# Patient Record
Sex: FEMALE | Race: WHITE | Employment: FULL TIME | URBAN - METROPOLITAN AREA
[De-identification: names, ages, dates, MRNs, and addresses within clinical notes are randomized per-mention and may not be internally consistent; named-entity substitution may affect disease eponyms.]

---

## 2017-07-21 ENCOUNTER — OFFICE VISIT (OUTPATIENT)
Dept: FAMILY MEDICINE CLINIC | Age: 47
End: 2017-07-21

## 2017-07-21 VITALS
SYSTOLIC BLOOD PRESSURE: 118 MMHG | HEART RATE: 86 BPM | BODY MASS INDEX: 22.66 KG/M2 | WEIGHT: 132 LBS | OXYGEN SATURATION: 97 % | DIASTOLIC BLOOD PRESSURE: 70 MMHG

## 2017-07-21 DIAGNOSIS — Z00.00 WELL ADULT EXAM: Primary | ICD-10-CM

## 2017-07-21 DIAGNOSIS — L21.9 SEBORRHEIC DERMATITIS OF SCALP: ICD-10-CM

## 2017-07-21 DIAGNOSIS — F51.01 PRIMARY INSOMNIA: ICD-10-CM

## 2017-07-21 PROCEDURE — 99396 PREV VISIT EST AGE 40-64: CPT | Performed by: FAMILY MEDICINE

## 2017-07-21 RX ORDER — CLOBETASOL PROPIONATE 0.5 MG/G
AEROSOL, FOAM TOPICAL
Qty: 1 CAN | Refills: 12 | Status: SHIPPED | OUTPATIENT
Start: 2017-07-21 | End: 2019-01-07 | Stop reason: CLARIF

## 2017-07-21 RX ORDER — AMITRIPTYLINE HYDROCHLORIDE 10 MG/1
TABLET, FILM COATED ORAL
Qty: 30 TABLET | Refills: 12 | Status: SHIPPED | OUTPATIENT
Start: 2017-07-21 | End: 2018-08-03 | Stop reason: SDUPTHER

## 2017-07-21 RX ORDER — M-VIT,TX,IRON,MINS/CALC/FOLIC 27MG-0.4MG
1 TABLET ORAL DAILY
COMMUNITY
End: 2019-09-12 | Stop reason: CLARIF

## 2017-08-02 LAB
HPV COMMENT: NORMAL
HPV TYPE 16: NOT DETECTED
HPV TYPE 18: NOT DETECTED
HPVOH (OTHER TYPES): NOT DETECTED

## 2017-09-22 ENCOUNTER — TELEPHONE (OUTPATIENT)
Dept: FAMILY MEDICINE CLINIC | Age: 47
End: 2017-09-22

## 2017-09-25 ENCOUNTER — OFFICE VISIT (OUTPATIENT)
Dept: FAMILY MEDICINE CLINIC | Age: 47
End: 2017-09-25

## 2017-09-25 VITALS
OXYGEN SATURATION: 99 % | DIASTOLIC BLOOD PRESSURE: 70 MMHG | HEART RATE: 82 BPM | BODY MASS INDEX: 23.21 KG/M2 | WEIGHT: 131 LBS | SYSTOLIC BLOOD PRESSURE: 110 MMHG

## 2017-09-25 DIAGNOSIS — F41.9 ANXIETY: Primary | ICD-10-CM

## 2017-09-25 PROCEDURE — 99213 OFFICE O/P EST LOW 20 MIN: CPT | Performed by: FAMILY MEDICINE

## 2017-12-27 ENCOUNTER — HOSPITAL ENCOUNTER (OUTPATIENT)
Dept: MAMMOGRAPHY | Age: 47
Discharge: HOME | End: 2017-12-27
Attending: OBSTETRICS & GYNECOLOGY | Admitting: OBSTETRICS & GYNECOLOGY

## 2017-12-27 DIAGNOSIS — Z12.39 BREAST CANCER SCREENING: ICD-10-CM

## 2018-01-08 ENCOUNTER — HOSPITAL ENCOUNTER (OUTPATIENT)
Dept: OTHER | Age: 48
Discharge: HOME | End: 2018-01-08
Attending: FAMILY MEDICINE | Admitting: FAMILY MEDICINE

## 2018-01-08 DIAGNOSIS — Z12.39 BREAST CANCER SCREENING: ICD-10-CM

## 2018-03-28 ENCOUNTER — TELEPHONE (OUTPATIENT)
Dept: FAMILY MEDICINE CLINIC | Age: 48
End: 2018-03-28

## 2018-03-28 RX ORDER — FLUCONAZOLE 150 MG/1
150 TABLET ORAL ONCE
Qty: 1 TABLET | Refills: 0 | Status: SHIPPED | OUTPATIENT
Start: 2018-03-28 | End: 2018-03-28

## 2018-08-03 RX ORDER — AMITRIPTYLINE HYDROCHLORIDE 10 MG/1
TABLET, FILM COATED ORAL
Qty: 30 TABLET | Refills: 0 | Status: SHIPPED | OUTPATIENT
Start: 2018-08-03 | End: 2018-08-15 | Stop reason: SDUPTHER

## 2018-08-15 ENCOUNTER — OFFICE VISIT (OUTPATIENT)
Dept: FAMILY MEDICINE CLINIC | Age: 48
End: 2018-08-15

## 2018-08-15 VITALS
WEIGHT: 127 LBS | BODY MASS INDEX: 22.5 KG/M2 | HEART RATE: 79 BPM | OXYGEN SATURATION: 98 % | SYSTOLIC BLOOD PRESSURE: 100 MMHG | DIASTOLIC BLOOD PRESSURE: 66 MMHG

## 2018-08-15 DIAGNOSIS — F51.01 PRIMARY INSOMNIA: ICD-10-CM

## 2018-08-15 DIAGNOSIS — K63.5 POLYP OF COLON, UNSPECIFIED PART OF COLON, UNSPECIFIED TYPE: ICD-10-CM

## 2018-08-15 DIAGNOSIS — Z00.00 WELL ADULT EXAM: Primary | ICD-10-CM

## 2018-08-15 PROCEDURE — 99396 PREV VISIT EST AGE 40-64: CPT | Performed by: FAMILY MEDICINE

## 2018-08-15 RX ORDER — AMITRIPTYLINE HYDROCHLORIDE 10 MG/1
TABLET, FILM COATED ORAL
Qty: 90 TABLET | Refills: 3 | Status: SHIPPED | OUTPATIENT
Start: 2018-08-15 | End: 2019-08-23 | Stop reason: SDUPTHER

## 2018-08-15 RX ORDER — ASCORBIC ACID 1000 MG
TABLET ORAL DAILY
COMMUNITY
End: 2019-09-12 | Stop reason: CLARIF

## 2018-08-15 ASSESSMENT — PATIENT HEALTH QUESTIONNAIRE - PHQ9
1. LITTLE INTEREST OR PLEASURE IN DOING THINGS: 0
SUM OF ALL RESPONSES TO PHQ QUESTIONS 1-9: 0
SUM OF ALL RESPONSES TO PHQ9 QUESTIONS 1 & 2: 0
SUM OF ALL RESPONSES TO PHQ QUESTIONS 1-9: 0
2. FEELING DOWN, DEPRESSED OR HOPELESS: 0

## 2018-08-15 NOTE — PROGRESS NOTES
Subjective:      Patient ID: Will Beltre is a 50 y.o. female. HPI patient presents today for her annual physical.    Here for annual physical.    Dental up-to-date  Eye up-to-date  Pap NA    Exercise: 3-4 times a week, mix of biking, bar, walking, yoga  Diet: best it's been in a long time    HM reviewed with pt    Patient's medications, allergies, past medical, surgical, social and family histories were reviewed and updated in the EHR as appropriate. Uses amtriptyline for sleep, works well, no SE. Review of Systems    Objective:   Physical Exam    Body mass index is 22.5 kg/m². Vitals:    08/15/18 1613   BP: 100/66   Site: Left Arm   Position: Sitting   Cuff Size: Medium Adult   Pulse: 79   SpO2: 98%   Weight: 127 lb (57.6 kg)     Wt Readings from Last 3 Encounters:   08/15/18 127 lb (57.6 kg)   09/25/17 131 lb (59.4 kg)   09/22/17 130 lb (59 kg)     PHQ score: PHQ-9 Total Score: 0 (8/15/2018  4:14 PM)    GENERAL:Alert and oriented x 4 NAD, normal appearing weight, well hydrated, well developed. NECK:supple and non tender without mass, no thyromegaly or thyroid nodules, no cervical lymphadenopathy, no bruits  LUNG:clear to auscultation bilaterally with normal respiratory effort  CV: Normal heart sounds, regular rate and rhythm without murmurs  EXTREMETY: no loss of hair, no edema, normal pedal pulses bilaterally            Assessment:     Britni Franco was seen today for annual exam.    Diagnoses and all orders for this visit:    Well adult exam  Continue healthy diet and exercise  HM reveiwed with pt    Primary insomnia  -Stable, continue current medications.     Polyp of colon, unspecified part of colon, unspecified type  UTD with colonoscopy    Other orders  -     amitriptyline (ELAVIL) 10 MG tablet; TAKE ONE TABLET BY MOUTH ONCE NIGHTLY

## 2018-12-15 ENCOUNTER — TELEPHONE (OUTPATIENT)
Dept: FAMILY MEDICINE CLINIC | Age: 48
End: 2018-12-15

## 2019-01-07 ENCOUNTER — TELEPHONE (OUTPATIENT)
Dept: FAMILY MEDICINE CLINIC | Age: 49
End: 2019-01-07

## 2019-01-07 ENCOUNTER — OFFICE VISIT (OUTPATIENT)
Dept: FAMILY MEDICINE CLINIC | Age: 49
End: 2019-01-07
Payer: COMMERCIAL

## 2019-01-07 VITALS
WEIGHT: 130 LBS | OXYGEN SATURATION: 98 % | BODY MASS INDEX: 23.03 KG/M2 | SYSTOLIC BLOOD PRESSURE: 120 MMHG | HEART RATE: 102 BPM | DIASTOLIC BLOOD PRESSURE: 76 MMHG

## 2019-01-07 DIAGNOSIS — F41.9 ANXIETY: Primary | ICD-10-CM

## 2019-01-07 PROCEDURE — 99213 OFFICE O/P EST LOW 20 MIN: CPT | Performed by: FAMILY MEDICINE

## 2019-01-10 ENCOUNTER — HOSPITAL ENCOUNTER (OUTPATIENT)
Dept: WOMENS IMAGING | Age: 49
Discharge: HOME | End: 2019-01-10
Payer: COMMERCIAL

## 2019-01-10 DIAGNOSIS — Z12.31 ENCOUNTER FOR SCREENING MAMMOGRAM FOR BREAST CANCER: ICD-10-CM

## 2019-01-10 PROCEDURE — 77063 BREAST TOMOSYNTHESIS BI: CPT

## 2019-08-23 RX ORDER — AMITRIPTYLINE HYDROCHLORIDE 10 MG/1
TABLET, FILM COATED ORAL
Qty: 30 TABLET | Refills: 0 | Status: SHIPPED | OUTPATIENT
Start: 2019-08-23 | End: 2019-09-12 | Stop reason: SDUPTHER

## 2019-08-23 NOTE — TELEPHONE ENCOUNTER
Pt requesting medication refill on   amitriptyline (ELAVIL) 10 MG tablet     Pharmacy:  [facility] [address], [address] [phone] - F [phone]   Please contact pt when filled    LOV:1/7/19  FOV:9/12/19

## 2019-08-23 NOTE — TELEPHONE ENCOUNTER
Pt requesting medication refill on   amitriptyline (ELAVIL) 10 MG tablet      Pharmacy:  [facility] [address], [address] [phone] - F [phone]   Please contact pt when filled     LOV:1/7/19  FOV:9/12/19

## 2019-08-23 NOTE — TELEPHONE ENCOUNTER
Medication:   Requested Prescriptions     Pending Prescriptions Disp Refills    amitriptyline (ELAVIL) 10 MG tablet 90 tablet 3     Sig: TAKE ONE TABLET BY MOUTH ONCE NIGHTLY      Last Filled:  8/15/2018    Patient Phone Number: [phone] (home) [phone] (work)    Last appt: 1/7/2019   Next appt: 9/12/2019    Last OARRS: No flowsheet data found.     Preferred Pharmacy:   [facility] [address]  [place] [address]  Phone: [phone] Fax: [phone]

## 2019-09-12 ENCOUNTER — OFFICE VISIT (OUTPATIENT)
Dept: FAMILY MEDICINE CLINIC | Age: 49
End: 2019-09-12
Payer: COMMERCIAL

## 2019-09-12 VITALS
HEIGHT: 64 IN | HEART RATE: 93 BPM | SYSTOLIC BLOOD PRESSURE: 124 MMHG | OXYGEN SATURATION: 95 % | WEIGHT: 134 LBS | DIASTOLIC BLOOD PRESSURE: 80 MMHG | BODY MASS INDEX: 22.88 KG/M2

## 2019-09-12 DIAGNOSIS — Z00.00 WELL ADULT EXAM: Primary | ICD-10-CM

## 2019-09-12 DIAGNOSIS — Z23 NEED FOR INFLUENZA VACCINATION: ICD-10-CM

## 2019-09-12 DIAGNOSIS — F51.01 PRIMARY INSOMNIA: ICD-10-CM

## 2019-09-12 DIAGNOSIS — F41.9 ANXIETY: ICD-10-CM

## 2019-09-12 PROCEDURE — 90686 IIV4 VACC NO PRSV 0.5 ML IM: CPT | Performed by: FAMILY MEDICINE

## 2019-09-12 PROCEDURE — 99396 PREV VISIT EST AGE 40-64: CPT | Performed by: FAMILY MEDICINE

## 2019-09-12 PROCEDURE — 90471 IMMUNIZATION ADMIN: CPT | Performed by: FAMILY MEDICINE

## 2019-09-12 RX ORDER — AMITRIPTYLINE HYDROCHLORIDE 10 MG/1
TABLET, FILM COATED ORAL
Qty: 30 TABLET | Refills: 12 | Status: SHIPPED | OUTPATIENT
Start: 2019-09-12 | End: 2020-09-14

## 2019-09-12 SDOH — HEALTH STABILITY: MENTAL HEALTH: HOW OFTEN DO YOU HAVE A DRINK CONTAINING ALCOHOL?: 2-4 TIMES A MONTH

## 2019-09-12 SDOH — HEALTH STABILITY: MENTAL HEALTH: HOW MANY STANDARD DRINKS CONTAINING ALCOHOL DO YOU HAVE ON A TYPICAL DAY?: 3 OR 4

## 2019-09-12 ASSESSMENT — PATIENT HEALTH QUESTIONNAIRE - PHQ9
2. FEELING DOWN, DEPRESSED OR HOPELESS: 0
1. LITTLE INTEREST OR PLEASURE IN DOING THINGS: 0
SUM OF ALL RESPONSES TO PHQ9 QUESTIONS 1 & 2: 0
SUM OF ALL RESPONSES TO PHQ QUESTIONS 1-9: 0
SUM OF ALL RESPONSES TO PHQ QUESTIONS 1-9: 0

## 2019-09-12 NOTE — PATIENT INSTRUCTIONS
season. But even when the vaccine doesn't exactly match these viruses, it may still provide some protection. Flu vaccine cannot prevent:  · Flu that is caused by a virus not covered by the vaccine. · Illnesses that look like flu but are not. Some people should not get this vaccine  Tell the person who is giving you the vaccine:  · If you have any severe (life-threatening) allergies. If you ever had a life-threatening allergic reaction after a dose of flu vaccine, or have a severe allergy to any part of this vaccine, you may be advised not to get vaccinated. Most, but not all, types of flu vaccine contain a small amount of egg protein. · If you ever had Guillain-Barré syndrome (also called GBS) Some people with a history of GBS should not get this vaccine. This should be discussed with your doctor. · If you are not feeling well. It is usually okay to get flu vaccine when you have a mild illness, but you might be asked to come back when you feel better. Risks of a vaccine reaction  With any medicine, including vaccines, there is a chance of reactions. These are usually mild and go away on their own, but serious reactions are also possible. Most people who get a flu shot do not have any problems with it. Minor problems following a flu shot include:  · Soreness, redness, or swelling where the shot was given  · Hoarseness  · Sore, red or itchy eyes  · Cough  · Fever  · Aches  · Headache  · Itching  · Fatigue  If these problems occur, they usually begin soon after the shot and last 1 or 2 days. More serious problems following a flu shot can include the following:  · There may be a small increased risk of Guillain-Barré Syndrome (GBS) after inactivated flu vaccine. This risk has been estimated at 1 or 2 additional cases per million people vaccinated. This is much lower than the risk of severe complications from flu, which can be prevented by flu vaccine.   · Artemio York children who get the flu shot along with pneumococcal vaccine (PCV13) and/or DTaP vaccine at the same time might be slightly more likely to have a seizure caused by fever. Ask your doctor for more information. Tell your doctor if a child who is getting flu vaccine has ever had a seizure  Problems that could happen after any injected vaccine:  · People sometimes faint after a medical procedure, including vaccination. Sitting or lying down for about 15 minutes can help prevent fainting, and injuries caused by a fall. Tell your doctor if you feel dizzy, or have vision changes or ringing in the ears. · Some people get severe pain in the shoulder and have difficulty moving the arm where a shot was given. This happens very rarely. · Any medication can cause a severe allergic reaction. Such reactions from a vaccine are very rare, estimated at about 1 in a million doses, and would happen within a few minutes to a few hours after the vaccination. As with any medicine, there is a very remote chance of a vaccine causing a serious injury or death. The safety of vaccines is always being monitored. For more information, visit: www.cdc.gov/vaccinesafety/. What if there is a serious reaction? What should I look for? · Look for anything that concerns you, such as signs of a severe allergic reaction, very high fever, or unusual behavior. Signs of a severe allergic reaction can include hives, swelling of the face and throat, difficulty breathing, a fast heartbeat, dizziness, and weakness - usually within a few minutes to a few hours after the vaccination. What should I do? · If you think it is a severe allergic reaction or other emergency that can't wait, call 9-1-1 and get the person to the nearest hospital. Otherwise, call your doctor. · Reactions should be reported to the \"Vaccine Adverse Event Reporting System\" (VAERS). Your doctor should file this report, or you can do it yourself through the VAERS website at www.vaers. [facility].gov, or by calling

## 2020-01-14 ENCOUNTER — HOSPITAL ENCOUNTER (OUTPATIENT)
Dept: WOMENS IMAGING | Age: 50
Discharge: HOME | End: 2020-01-14
Payer: COMMERCIAL

## 2020-01-14 PROCEDURE — 77063 BREAST TOMOSYNTHESIS BI: CPT

## 2020-07-14 ENCOUNTER — OFFICE VISIT (OUTPATIENT)
Dept: PRIMARY CARE CLINIC | Age: 50
End: 2020-07-14
Payer: COMMERCIAL

## 2020-07-14 PROCEDURE — 99211 OFF/OP EST MAY X REQ PHY/QHP: CPT | Performed by: NURSE PRACTITIONER

## 2020-07-19 LAB
SARS-COV-2: NOT DETECTED
SOURCE: NORMAL

## 2020-07-21 ENCOUNTER — OFFICE VISIT (OUTPATIENT)
Dept: FAMILY MEDICINE CLINIC | Age: 50
End: 2020-07-21
Payer: COMMERCIAL

## 2020-07-21 VITALS
OXYGEN SATURATION: 99 % | BODY MASS INDEX: 23.23 KG/M2 | WEIGHT: 136.4 LBS | SYSTOLIC BLOOD PRESSURE: 108 MMHG | TEMPERATURE: 98.4 F | HEART RATE: 91 BPM | DIASTOLIC BLOOD PRESSURE: 68 MMHG

## 2020-07-21 PROCEDURE — 99213 OFFICE O/P EST LOW 20 MIN: CPT | Performed by: PHYSICIAN ASSISTANT

## 2020-07-21 RX ORDER — LORAZEPAM 0.5 MG/1
0.5 TABLET ORAL NIGHTLY PRN
Qty: 30 TABLET | Refills: 0 | Status: SHIPPED | OUTPATIENT
Start: 2020-07-21 | End: 2020-09-14

## 2020-07-21 RX ORDER — AMITRIPTYLINE HYDROCHLORIDE 25 MG/1
25 TABLET, FILM COATED ORAL NIGHTLY
Qty: 30 TABLET | Refills: 5 | Status: SHIPPED | OUTPATIENT
Start: 2020-07-21 | End: 2020-09-14 | Stop reason: SDUPTHER

## 2020-07-21 RX ORDER — ZOSTER VACCINE RECOMBINANT, ADJUVANTED 50 MCG/0.5
0.5 KIT INTRAMUSCULAR SEE ADMIN INSTRUCTIONS
Qty: 0.5 ML | Refills: 0 | Status: SHIPPED | OUTPATIENT
Start: 2020-07-21 | End: 2020-09-14

## 2020-07-21 RX ORDER — M-VIT,TX,IRON,MINS/CALC/FOLIC 27MG-0.4MG
1 TABLET ORAL DAILY
COMMUNITY
End: 2021-09-24

## 2020-07-21 ASSESSMENT — PATIENT HEALTH QUESTIONNAIRE - PHQ9
SUM OF ALL RESPONSES TO PHQ9 QUESTIONS 1 & 2: 0
SUM OF ALL RESPONSES TO PHQ QUESTIONS 1-9: 0
SUM OF ALL RESPONSES TO PHQ QUESTIONS 1-9: 0
1. LITTLE INTEREST OR PLEASURE IN DOING THINGS: 0
2. FEELING DOWN, DEPRESSED OR HOPELESS: 0

## 2020-07-21 ASSESSMENT — ENCOUNTER SYMPTOMS
COUGH: 0
BACK PAIN: 0
SHORTNESS OF BREATH: 0
ABDOMINAL PAIN: 0

## 2020-07-21 NOTE — PATIENT INSTRUCTIONS
Psychiatry    [facility] Consultation and Crisis Team (Suicide)  [phone]    [facility] Team (Suicide)  [phone]    Psychology Today  Resource to find providers  Www. psychologytoday. Clara Radames [facility]  [phone]    [address] [facility] [address]   [facility], [address]   Phone: [phone]    Fax: [phone] Psychological and Counseling Services  [address] [place] [address]   [phone]    MD Dr. Julio Kaur Dr., MD Shirlean Meals, MD  [address],   [address]   [phone]    Ramon Pollard, PhD  Shonna Montero, PhD  Yashira Traylor, PhD  [facility]  [phone]    [address] [address] [address]  [phone]    Janese Angelucci, [address] [place] [address]. [facility], [address] [facility]  [phone]    Dr. Nabil Haskins  [phone] (outpatient)      Dr. Shira Arias  [facility] [place]  [phone]    Dr. Marlen Talavera MD  [facility].  [facility], . [address]  [phone]    Dr. Robin Marino MD  [facility].  [facility], . [address]    Patrick Olivia MD   [phone]    [facility]  [phone]    [facility]  Dr. Ralph Cardoza  [phone] [address] [facility] [address], [address] [facility]  [facility]. [address]  Phone [phone], Fax [phone]    [address]  [phone]    Substance Abuse    [facility]  [address] [place] [address].   [address]  Phone  [phone]  Walk-in treatment assessments Monday- Friday 8AM-2PM    Mental Health and Substance Abuse  [address] Gonsalo Bland Anandaeloisa. [facility], [address] [place] [address]  Phone [phone]    [address]  [address]    Linn Renee L[facility]  [phone]    [facility]  [phone]    [facility]  [address] [place] [address] Way  [phone]              Joie Ramos was seen today for anxiety. Diagnoses and all orders for this visit:    Anxiety  -     amitriptyline (ELAVIL) 25 MG tablet; Take 1 tablet by mouth nightly  -     LORazepam (ATIVAN) 0.5 MG tablet; Take 1 tablet by mouth nightly as needed for Anxiety for up to 30 days. Need for shingles vaccine  -     zoster recombinant adjuvanted vaccine [facility]) 50 MCG/0.5ML SUSR injection;  Inject 0.5 mLs into the muscle See Admin Instructions 1 dose now and repeat in 2-6 months

## 2020-07-21 NOTE — PROGRESS NOTES
tablet; Take 1 tablet by mouth nightly  -     LORazepam (ATIVAN) 0.5 MG tablet; Take 1 tablet by mouth nightly as needed for Anxiety for up to 30 days. Need for shingles vaccine  -     zoster recombinant adjuvanted vaccine [facility]) 50 MCG/0.5ML SUSR injection; Inject 0.5 mLs into the muscle See Admin Instructions 1 dose now and repeat in 2-6 months             Plan:      Controlled substances monitoring: possible medication side effects, risk of tolerance and/or dependence, and alternative treatments discussed and no signs of potential drug abuse or diversion identified. Return in 2 months for physical, call before that with any concerns.    Sarah Gross

## 2020-09-02 ENCOUNTER — TELEPHONE (OUTPATIENT)
Dept: FAMILY MEDICINE CLINIC | Age: 50
End: 2020-09-02

## 2020-09-02 NOTE — TELEPHONE ENCOUNTER
Pt reports having right elbow pain for 1 month. Cannot recall any injury to right elbow. C/o pain in elbow when gripping, pulling or pushing. Tried aleve, advil and ibuprofen without relief. She states appt on 9/14 and wanting to know if she could have a stronger anti inflammatory she could try prior to her appointment and then discuss if it is helping or not and next steps at her next appointment.

## 2020-09-02 NOTE — TELEPHONE ENCOUNTER
Diclofenac 50mg BID with food x 7 days then prn pain #30 1 RF    See if wants to be seen sooner for her elbow

## 2020-09-02 NOTE — TELEPHONE ENCOUNTER
Patient requesting blood work orders for her upcoming appt on 9/14/2020. Patient will be going to [facility] for her blood work.

## 2020-09-02 NOTE — TELEPHONE ENCOUNTER
----- Message from Derian Khloes sent at 9/2/2020  8:43 AM EDT -----  Subject: Message to Provider    QUESTIONS  Information for Provider? Patient is having pain in right elbow. They did   not have any known injury to their elbow. They want to know if they can be   prescribed Meloxicam 15mg because regular pain medication and ice isn't   helping  ---------------------------------------------------------------------------  --------------  CALL BACK INFO  What is the best way for the office to contact you? OK to leave message on   voicemail  Preferred Call Back Phone Number? [phone]  ---------------------------------------------------------------------------  --------------  SCRIPT ANSWERS  Relationship to Patient?  Self

## 2020-09-09 ENCOUNTER — HOSPITAL ENCOUNTER (OUTPATIENT)
Age: 50
Discharge: HOME | End: 2020-09-09
Payer: COMMERCIAL

## 2020-09-09 LAB
CHOLESTEROL, TOTAL: 228 MG/DL (ref 0–199)
GLUCOSE BLD-MCNC: 87 MG/DL (ref 70–99)
HDLC SERPL-MCNC: 53 MG/DL (ref 40–60)
LDL CHOLESTEROL CALCULATED: 156 MG/DL
TRIGL SERPL-MCNC: 97 MG/DL (ref 0–150)
VLDLC SERPL CALC-MCNC: 19 MG/DL

## 2020-09-09 PROCEDURE — 36415 COLL VENOUS BLD VENIPUNCTURE: CPT

## 2020-09-09 PROCEDURE — 82947 ASSAY GLUCOSE BLOOD QUANT: CPT

## 2020-09-09 PROCEDURE — 80061 LIPID PANEL: CPT

## 2020-09-14 ENCOUNTER — OFFICE VISIT (OUTPATIENT)
Dept: FAMILY MEDICINE CLINIC | Age: 50
End: 2020-09-14
Payer: COMMERCIAL

## 2020-09-14 VITALS
DIASTOLIC BLOOD PRESSURE: 68 MMHG | OXYGEN SATURATION: 99 % | HEIGHT: 65 IN | WEIGHT: 141 LBS | HEART RATE: 74 BPM | TEMPERATURE: 97.3 F | SYSTOLIC BLOOD PRESSURE: 114 MMHG | BODY MASS INDEX: 23.49 KG/M2

## 2020-09-14 PROBLEM — E78.00 PURE HYPERCHOLESTEROLEMIA: Status: ACTIVE | Noted: 2020-09-14

## 2020-09-14 PROCEDURE — 99396 PREV VISIT EST AGE 40-64: CPT | Performed by: FAMILY MEDICINE

## 2020-09-14 RX ORDER — AMITRIPTYLINE HYDROCHLORIDE 10 MG/1
TABLET, FILM COATED ORAL
Qty: 60 TABLET | Refills: 5 | Status: SHIPPED | OUTPATIENT
Start: 2020-09-14 | End: 2021-05-10

## 2020-09-14 NOTE — PATIENT INSTRUCTIONS
Patient Education     Orthopedists:    [facility]  Dr. Marshal Mayer  [phone]    Ayaka Mcintosh and MD Eva Beck MD Gerrianne Ares, MD Earlis Quick, MD  [phone]    Kathyleen Stallion Dr. Gerhardt Camera Dr. Tonya Brunt Dr. Lannette Brown Dr. Jeannie Morrison Dr. Ronnie Comas Dr. Johnney Clara Dr. Emory Singer Dr. Marce Para Dr. Irl Forehand Dr. Bronwyn Armor Dr. Jeane Buckler Dr. [employer] Dr. Rosslyn Jaksch Dr. Eilleen Browns  [phone] [address]  Dr. Lorie Niño   [phone]         Well Visit, Women 48 to 72: Care Instructions  Your Care Instructions     Physical exams can help you stay healthy. Your doctor has checked your overall health and may have suggested ways to take good care of yourself. He or she also may have recommended tests. At home, you can help prevent illness with healthy eating, regular exercise, and other steps. Follow-up care is a key part of your treatment and safety. Be sure to make and go to all appointments, and call your doctor if you are having problems. It's also a good idea to know your test results and keep a list of the medicines you take. How can you care for yourself at home? · Reach and stay at a healthy weight. This will lower your risk for many problems, such as obesity, diabetes, heart disease, and high blood pressure. · Get at least 30 minutes of exercise on most days of the week. Walking is a good choice. You also may want to do other activities, such as running, swimming, cycling, or playing tennis or team sports. · Do not smoke. Smoking can make health problems worse. If you need help quitting, talk to your doctor about stop-smoking programs and medicines. These can increase your chances of quitting for good. · Protect your skin from too much sun. When you're outdoors from 10 a.m. to 4 p.m., stay in the shade or cover up with clothing and a hat with a wide brim. Wear sunglasses that block UV rays. Even when it's cloudy, put broad-spectrum sunscreen (SPF 30 or higher) on any exposed skin. · See a dentist one or two times a year for checkups and to have your teeth cleaned. · Wear a seat belt in the car. Follow your doctor's advice about when to have certain tests. These tests can spot problems early. · Cholesterol. Your doctor will tell you how often to have this done based on your age, family history, or other things that can increase your risk for heart attack and stroke. · Blood pressure. Have your blood pressure checked during a routine doctor visit. Your doctor will tell you how often to check your blood pressure based on your age, your blood pressure results, and other factors. · Mammogram. Ask your doctor how often you should have a mammogram, which is an X-ray of your breasts. A mammogram can spot breast cancer before it can be felt and when it is easiest to treat. · Pap test and pelvic exam. Ask your doctor how often you should have a Pap test. You may not need to have a Pap test as often as you used to. · Vision. Have your eyes checked every year or two or as often as your doctor suggests. Some experts recommend that you have yearly exams for glaucoma and other age-related eye problems starting at age 48. · Hearing. Tell your doctor if you notice any change in your hearing. You can have tests to find out how well you hear. · Diabetes. Ask your doctor whether you should have tests for diabetes. · Colorectal cancer. Your risk for colorectal cancer gets higher as you get older. Some experts say that adults should start regular screening at age 48 and stop at age 76. Others say to start before age 48 or continue after age 76. Talk with your doctor about your risk and when to start and stop screening. · Thyroid disease.  Talk to your doctor about whether to have your thyroid checked as part of a regular physical exam. Women have an increased chance of a thyroid problem. · Osteoporosis. You should begin tests for bone density at age 72. If you are younger than 72, ask your doctor whether you have factors that may increase your risk for this disease. You may want to have this test before age 72. · Heart attack and stroke risk. At least every 4 to 6 years, you should have your risk for heart attack and stroke assessed. Your doctor uses factors such as your age, blood pressure, cholesterol, and whether you smoke or have diabetes to show what your risk for a heart attack or stroke is over the next 10 years. When should you call for help? Watch closely for changes in your health, and be sure to contact your doctor if you have any problems or symptoms that concern you. Where can you learn more? Go to https://[employer]peiaineb.[employer]. org and sign in to your [employer] account. Enter P258 in the [employer] box to learn more about \"Well Visit, Women 50 to 72: Care Instructions. \"     If you do not have an account, please click on the \"Sign Up Now\" link. Current as of: August 22, 2019               Content Version: 12.5  © 4336-3152 Healthwise, Incorporated. Care instructions adapted under license by [facility] ([facility]). If you have questions about a medical condition or this instruction, always ask your healthcare professional. [place] [address] any warranty or liability for your use of this information. Patient Education        Tennis Elbow: Exercises  Introduction  Here are some examples of exercises for you to try. The exercises may be suggested for a condition or for rehabilitation. Start each exercise slowly. Ease off the exercises if you start to have pain. You will be told when to start these exercises and which ones will work best for you. How to do the exercises  Wrist flexor stretch   1.  Extend your arm in front

## 2020-09-14 NOTE — PROGRESS NOTES
Here for annual physical.    Dental: up-to-date  Eye: up-to-date    Colonoscopy: up-to-date    Pap: up-to-date, see gynecologist in 2 weeks, had hysterectomy   Mammo: up-to-date    Exercise: walking 3 1/2 miles 3-5 times a week  Diet: not very good, could be be better    Saw AJ in July for anxiety. Got lorazepam and increased amitriptyline for sleep. Only took twice. Increasing amitriptyline helped with sleep and feeling better and handling things better. Having right elbow pain. Has been taking diclofenac. Helping but not getting better. Doing more typing than normal working from home. Notes with  even with coffee cup or bending will get pain in elbow. Feels swollen but doesn't see the swelling. Yesterday had to lift chair hurt. Pt has been icing after activity and helps.   + right handed. HM reviewed with pt    Patient's medications, allergies, past medical, surgical, social and family histories were reviewed and updated in the EHR as appropriate. Body mass index is 23.83 kg/m². Vitals:    09/14/20 0750   BP: 114/68   Site: Left Upper Arm   Position: Sitting   Cuff Size: Medium Adult   Pulse: 74   Temp: 97.3 °F (36.3 °C)   TempSrc: Temporal   SpO2: 99%   Weight: 141 lb (64 kg)   Height: 5' 4.5\" (1.638 m)     Wt Readings from Last 3 Encounters:   09/14/20 141 lb (64 kg)   07/21/20 136 lb 6.4 oz (61.9 kg)   09/12/19 134 lb (60.8 kg)     PHQ score: No data recorded    GENERAL:Alert and oriented x 4 NAD, affect appropriate and normal appearing weight, well hydrated, well developed.   NECK:supple and non tender without mass, no thyromegaly or thyroid nodules, no cervical lymphadenopathy  LUNG:clear to auscultation bilaterally with normal respiratory effort  CV: Normal heart sounds, regular rate and rhythm without murmurs  EXTREMETY: no loss of hair, no edema, normal pedal pulses bilaterally  Right elbow Pain over lateral epicondyle with flexion of wrist and , tender palpation over lateral epicondyle      ASSESSMENT AND PLAN:       El oV was seen today for annual exam.    Diagnoses and all orders for this visit:    Well adult exam  Recommended screenings discussed and ordered if patient agreed  Recommended vaccinations discussed and ordered if patient agreed- going to get flu at pharmacy  Encouraged healthy diet   Encouraged regular exercise and maintaining a healthy weight    Anxiety    -Stable, continue current medications. Controlled Substances Monitoring:   OARRS report reviewed  Periodic Controlled Substance Monitoring: No signs of potential drug abuse or diversion identified. Ava Pierce MD)        Primary insomnia  -Stable, continue current medications. -     amitriptyline (ELAVIL) 10 MG tablet; 1-2 at night as needed for sleep    Lateral epicondylitis of right elbow  See ortho    Pure hypercholesterolemia  -Encourage low carb diet, watching calories, regular exercise and weight loss  Recheck yearly  Reviewed recent labs with patient. Return in about 1 year (around 9/14/2021).            Note per JAKY Kolb and Scribe with corrections and edits per Ava Pierce MD.  I agree with entirety of note and was present and performed history and physical.  I also confirm that the note above accurately reflects all work, treatment, procedures, and medical decision making performed by me, Ava Pierce MD

## 2020-09-18 ENCOUNTER — OFFICE VISIT (OUTPATIENT)
Dept: ORTHOPEDIC SURGERY | Age: 50
End: 2020-09-18
Payer: COMMERCIAL

## 2020-09-18 VITALS — WEIGHT: 140 LBS | TEMPERATURE: 97.3 F | HEIGHT: 64 IN | BODY MASS INDEX: 23.9 KG/M2

## 2020-09-18 PROCEDURE — 20551 NJX 1 TENDON ORIGIN/INSJ: CPT | Performed by: ORTHOPAEDIC SURGERY

## 2020-09-18 PROCEDURE — 99203 OFFICE O/P NEW LOW 30 MIN: CPT | Performed by: ORTHOPAEDIC SURGERY

## 2020-09-18 RX ORDER — METHYLPREDNISOLONE ACETATE 40 MG/ML
40 INJECTION, SUSPENSION INTRA-ARTICULAR; INTRALESIONAL; INTRAMUSCULAR; SOFT TISSUE ONCE
Status: COMPLETED | OUTPATIENT
Start: 2020-09-18 | End: 2020-09-18

## 2020-09-18 RX ADMIN — METHYLPREDNISOLONE ACETATE 40 MG: 40 INJECTION, SUSPENSION INTRA-ARTICULAR; INTRALESIONAL; INTRAMUSCULAR; SOFT TISSUE at 09:16

## 2020-09-18 NOTE — PROGRESS NOTES
Chief Complaint    Elbow Injury (right elbow )      History of Present Illness:  Marilu Roldan is a 48 y.o. female. She is here today for evaluation of her right elbow. She has had lateral sided right elbow pain that is been going on since the end of July of this year. She does not remember one particular injury which brought on her elbow pain. She is very active and has been doing some workout classes involving weights but does not particularly correlate the onset of this pain with her doing those activities. She denies radicular pain. Denies numbness or tingling. Medical History:  Patient's medications, allergies, past medical, surgical, social and family histories were reviewed and updated as appropriate. Review of Systems:  Pertinent items are noted in HPI  Review of systems reviewed from Patient History Form dated on 9/18/20 and available in the patient's chart under the Media tab. Vital Signs:  Temp 97.3 °F (36.3 °C)   Ht 5' 4\" (1.626 m)   Wt 140 lb (63.5 kg)   BMI 24.03 kg/m²     General Exam:   Constitutional: Patient is adequately groomed with no evidence of malnutrition  DTRs: Deep tendon reflexes are intact  Mental Status: The patient is oriented to time, place and person. The patient's mood and affect are appropriate. Hand Examination:    Inspection: No significant swelling erythema noted with right elbow today    Palpation: She does have tenderness palpation directly over her right elbow lateral epicondyle. Range of Motion: She does have full range of motion of her right elbow although she does have some discomfort going in the full extension    Strength: She does have some decreased  strength in the right hand. Good strength with resisted extension and flexion of the elbow    Special Tests: She does have a positive resisted wrist extension test.  No pain with resisted pronation of the forearm    Skin: There are no rashes, ulcerations or lesions.     Gait: Normal      Additional Comments:       Additional Examinations:         Left Upper Extremity: Examination of the left upper extremity does not show any tenderness, deformity or injury. Range of motion is unremarkable. There is no gross instability. There are no rashes, ulcerations or lesions. Strength and tone are normal.    Radiology:     X-rays obtained and reviewed in office:  Views 2 views of the right elbow demonstrates no obvious fracture dislocation or other osseous abnormalities          Assessment : Right elbow lateral epicondylitis    Impression:  Encounter Diagnoses   Name Primary?  Right elbow pain Yes    Lateral epicondylitis of right elbow        Office Procedures:  Orders Placed This Encounter   Procedures    XR ELBOW RIGHT (2 VIEWS)     Standing Status:   Future     Number of Occurrences:   1     Standing Expiration Date:   9/18/2021    Ambulatory referral to Physical Therapy     Referral Priority:   Routine     Referral Type:   Eval and Treat     Referral Reason:   Specialty Services Required     Number of Visits Requested:   1  [id number] - RI DRAIN/INJECT LARGE JOINT/BURSA       Treatment Plan: I discussed the diagnosis and treatment options with her today. She does have findings consistent with lateral epicondylitis. I would recommend at this time referral to physical therapy. She is agreeable with this plan. She also would like to try cortisone injection into the lateral condylar area today. We will proceed with that today. She is going to follow-up with me in 6 weeks to make sure she is getting improvement    Under sterile conditions the right elbow was injected into the lateral epicondylar area with 1 cc of 40 mg Depo-Medrol mixed with 1 cc of quarter percent Marcaine. She did tolerate the injection well.   Postinjection precautions were given

## 2020-09-22 ENCOUNTER — HOSPITAL ENCOUNTER (OUTPATIENT)
Dept: PHYSICAL THERAPY | Age: 50
Setting detail: THERAPIES SERIES
Discharge: HOME | End: 2020-09-22
Payer: COMMERCIAL

## 2020-09-22 PROCEDURE — 97140 MANUAL THERAPY 1/> REGIONS: CPT

## 2020-09-22 PROCEDURE — 97110 THERAPEUTIC EXERCISES: CPT

## 2020-09-22 PROCEDURE — 97161 PT EVAL LOW COMPLEX 20 MIN: CPT

## 2020-09-22 NOTE — FLOWSHEET NOTE
with long lever arm from weight  3# 2 10    Therabar Flex/Ext Twist  1 10                         Therapeutic Activities (85329)       Patient Education  5'                                 Neuromuscular Re-ed (07633)                                          Manual Intervention [id number])       IASTM - Hawk   - HG 7 sweeps/fans to medial/lateral aspect of forearm  - HG 8 sweeps/fans to medial/lateral aspect of forearm  - HG 9 brush/strum ECU, ECRB, and FCU with maintained hold on TP spots  15'     Post Cap mobs       Thoracic/Rib manipualtion       CT MT/Mobs       PROM MT                  Pt. Education:  -pt educated on diagnosis, prognosis and expectations for rehab  -all pt questions were answered    Home Exercise Program:  Access Code: [email or web address]   URL: [email or web address]   Date: 09/22/2020   Prepared by: Joaquim Soulier     Exercises   · Standing Wrist Flexion Stretch - 3 reps - 30s hold - 1x daily - 7x weekly   · Wrist Flexor Stretch in Pronation - 3 reps - 30s hold - 1x daily - 7x weekly   · Standing Wrist Extension Stretch - 3 reps - 30s hold - 1x daily - 7x weekly   · Seated Wrist Flexion with Dumbbell - 10 reps - 3 sets - 1x daily - 7x weekly   · Seated Wrist Extension with Dumbbell - 10 reps - 3 sets - 1x daily - 7x weekly   · Seated Wrist Radial Deviation with Dumbbell - 10 reps - 3 sets - 1x daily - 7x weekly   · Seated Wrist Ulnar Deviation with Dumbbell - 10 reps - 3 sets - 1x daily - 7x weekly   · Seated Wrist Supination Pronation with Can - 10 reps - 3 sets - 1x daily - 7x weekly    Therapeutic Exercise and NMR EXR  [x] (28893) Provided verbal/tactile cueing for activities related to strengthening, flexibility, endurance, ROM  for improvements in scapular, scapulothoracic and UE control with self care, reaching, carrying, lifting, house/yardwork, driving/computer work.    [] (36340) Provided verbal/tactile cueing for activities related to improving balance, coordination, kinesthetic sense, posture, motor skill, proprioception  to assist with  scapular, scapulothoracic and UE control with self care, reaching, carrying, lifting, house/yardwork, driving/computer work.  [] (99725) Therapist is in constant attendance of 2 or more patients providing skilled therapy interventions, but not providing any significant amount of measurable one-on-one time to either patient, for improvements in cervical, scapular, scapulothoracic and UE control with self care, reaching, carrying, lifting, house/yardwork, driving, computer work. Therapeutic Activities:    [x] (69790 or 01464) Provided verbal/tactile cueing for activities related to improving balance, coordination, kinesthetic sense, posture, motor skill, proprioception and motor activation to allow for proper function of scapular, scapulothoracic and UE control with self care, carrying, lifting, driving/computer work.      Home Exercise Program:    [x] (95830) Reviewed/Progressed HEP activities related to strengthening, flexibility, endurance, ROM of scapular, scapulothoracic and UE control with self care, reaching, carrying, lifting, house/yardwork, driving/computer work  [] (20836) Reviewed/Progressed HEP activities related to improving balance, coordination, kinesthetic sense, posture, motor skill, proprioception of scapular, scapulothoracic and UE control with self care, reaching, carrying, lifting, house/yardwork, driving/computer work      Manual Treatments:  PROM / STM / Oscillations-Mobs:  G-I, II, III, IV (PA's, Inf., Post.)  [x] (64321) Provided manual therapy to mobilize soft tissue/joints of cervical/CT, scapular GHJ and UE for the purpose of modulating pain, promoting relaxation,  increasing ROM, reducing/eliminating soft tissue swelling/inflammation/restriction, improving soft tissue extensibility and allowing for proper ROM for normal function with self care, reaching, carrying, lifting, house/yardwork, driving/computer work    Modalities:

## 2020-09-22 NOTE — PLAN OF CARE
coffee cup, staple papers, open doors, etc and that's when she went to an MD's appointment. She had to do an 8 hour drive last week that was very painful and she couldn't reach her arm out by the end of the week. She tried an elbow brace that has not helped. 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        OBJECTIVE:     Hand dominance: Right     Palpation: pain directly over lateral epicondyle and extensor muscle bellies, TP noted over ECRB and ECU; pain with palpation of FCU    Functional Mobility/Transfers: muscle pain present with activation of supinators/pronators in addition to wrist flex/ext - performed separately causes no pain only \"feels the muscle\"    Posture: good alignment    Bandages/Dressings/Incisions: n/a      Dermatomes Normal Abnormal Comments   Top of head (C1)      Posterior occipital region (C2)      Side of neck (C3)      Top of shoulder (C4) x     Lateral deltoid (C5) x     Tip of thumb (C6) x     Distal middle finger (C7) x     Distal fifth finger (C8) x     Medial forearm (T1) x         Reflexes Normal Abnormal Comments   C5-6 Biceps      C5-6 Brachioradialis      C7-8 Triceps      Stephensons           PROM AROM    L R L R   Cervical Flexion        Cervical Extension        Cervical Rotation       Cervical Side-bend       Shoulder Flexion        Shoulder Abduction Shoulder External Rotation        Shoulder Internal Rotation        Elbow Flexion    140 140   Elbow Extension    0 0   Pronation    90 90   Supination    90 90   Wrist Flexion    [facility] WFL   Wrist Extension    [facility] WFL   Radial Deviation    WFL WFL   Ulnar Deviation    WFL WFL       Strength (0-5) Left Right    Shoulder Shrug (C4)     Shoulder Flex     Shoulder Abd (C5)     Shoulder ER     Shoulder IR     Biceps (C6) 5 5   Triceps (C7) 5 5   Pronation  4 4   Supination  4 4   Wrist Flex (C7) 4 4   Wrist Ext (C6) 4 4   Wrist Radial Deviation 4 4   Wrist Ulnar Deviation 4 4     5 5          Joint mobility: R elbow    [x]Normal    []Hypo   []Hyper    Orthopaedic Special Tests Positive  Negative  NT Comments    Elbow        Cozen's x      Varus Stress  x     Valgus Stress   x     Tinel's    x               [x] Patient history, allergies, meds reviewed. Medical chart reviewed. See intake form. Review Of Systems (ROS):  [x]Performed Review of systems (Integumentary, CardioPulmonary, Neurological) by intake and observation. Intake form has been scanned into medical record. Patient has been instructed to contact their primary care physician regarding ROS issues if not already being addressed at this time.       Co-morbidities/Complexities (which will affect course of rehabilitation):   []None           Arthritic conditions   []Rheumatoid arthritis (M05.9)  []Osteoarthritis (M19.91)   Cardiovascular conditions   []Hypertension (I10)  []Hyperlipidemia (E78.5)  []Angina pectoris (I20)  []Atherosclerosis (I70)   Musculoskeletal conditions   []Disc pathology   []Congenital spine pathologies   []Prior surgical intervention  []Osteoporosis (M81.8)  []Osteopenia (M85.8)   Endocrine conditions   []Hypothyroid (E03.9)  []Hyperthyroid Gastrointestinal conditions   []Constipation (O67.64)   Metabolic conditions   []Morbid obesity (E66.01)  []Diabetes type 1(E10.65) or 2 (E11.65)   []Neuropathy (G60.9)     Pulmonary conditions []Asthma (J45)  []Coughing   []COPD (J44.9)   Psychological Disorders  [x]Anxiety (F41.9)  []Depression (F32.9)   []Other:   []Other:          Barriers to/and or personal factors that will affect rehab potential:              [x]Age  []Sex    []Smoker              [x]Motivation/Lack of Motivation                        [x]Co-Morbidities              []Cognitive Function, education/learning barriers              []Environmental, home barriers              []profession/work barriers  [x]past PT/medical experience  []other:  Justification: patient with limited co-morbidities and high motivation to return to PLOF, good prognosis      Falls Risk Assessment (30 days):   [x] Falls Risk assessed and no intervention required. [] Falls Risk assessed and Patient requires intervention due to being higher risk   TUG score (>12s at risk):     [] Falls education provided, including       ASSESSMENT: Durga Brown is a 54yo female presenting to OPPT due to acute lateral epicondylitis. She has pain with muscle activation of flexors/extensors of forearm when added with supination and pronation. She has muscle tension present in ECRB, ECU, and FCU. Hawkgrips utilized today to address muscle tension and followed up with stretching/strengthening that is added to HEP. Pt to be seen 1x/week and if symptoms do not improve by 3 sessions be referred to certified Dry Needling PT for further assessment. Pt to benefit from skilled PT to address deficits promoting full ROM, strength, and use of R UE without limitations.      Functional Impairments   []Noted spinal or UE joint hypomobility   []Noted spinal or UE joint hypermobility   [x]Decreased UE functional ROM   [x]Decreased UE functional strength   []Abnormal reflexes/sensation/myotomal/dermatomal deficits   [x]Decreased RC/scapular/core strength and neuromuscular control   []other:      Functional Activity Limitations (from functional questionnaire and intake)   [x]Reduced ability to tolerate prolonged functional positions   []Reduced ability or difficulty with changes of positions or transfers between positions   []Reduced ability to maintain good posture and demonstrate good body mechanics with sitting, bending, and lifting   [x] Reduced ability or tolerance with driving and/or computer work   []Reduced ability to sleep   [x]Reduced ability to perform lifting, reaching, carrying tasks   []Reduced ability to tolerate impact through UE   []Reduced ability to reach behind back   [x]Reduced ability to  or hold objects   []Reduced ability to throw or toss an object   []other:    Participation Restrictions   [x]Reduced participation in self care activities   [x]Reduced participation in home management activities   [x]Reduced participation in work activities   [x]Reduced participation in social activities. [x]Reduced participation in sport/recreation activities. Classification:   []Signs/symptoms consistent with post-surgical status including decreased ROM, strength and function.   [x]Signs/symptoms consistent with joint sprain/strain   []Signs/symptoms consistent with shoulder impingement   []Signs/symptoms consistent with shoulder/elbow/wrist tendinopathy   []Signs/symptoms consistent with Rotator cuff tear   []Signs/symptoms consistent with labral tear   []Signs/symptoms consistent with postural dysfunction    []Signs/symptoms consistent with Glenohumeral IR Deficit - <45 degrees   []Signs/symptoms consistent with facet dysfunction of cervical/thoracic spine    [x]Signs/symptoms consistent with pathology which may benefit from Dry needling     []other:     Prognosis/Rehab Potential:      []Excellent   [x]Good    []Fair   []Poor    Tolerance of evaluation/treatment:    []Excellent   [x]Good    []Fair   []Poor    Physical Therapy Evaluation Complexity Justification  [x] A history of present problem with:  [x] no personal factors and/or comorbidities that impact the plan of care;  []1-2 personal factors and/or comorbidities that impact the plan of care  []3 personal factors and/or comorbidities that impact the plan of care  [x] An examination of body systems using standardized tests and measures addressing any of the following: body structures and functions (impairments), activity limitations, and/or participation restrictions;:  [] a total of 1-2 or more elements   [x] a total of 3 or more elements   [] a total of 4 or more elements   [x] A clinical presentation with:  [x] stable and/or uncomplicated characteristics   [] evolving clinical presentation with changing characteristics  [] unstable and unpredictable characteristics;   [x] Clinical decision making of [x] low, [] moderate, [] high complexity using standardized patient assessment instrument and/or measurable assessment of functional outcome. [x] EVAL (LOW) 26724 (typically 15 minutes face-to-face)  [] EVAL (MOD) 95338 (typically 30 minutes face-to-face)  [] EVAL (HIGH) 84486 (typically 45 minutes face-to-face)  [] RE-EVAL     PLAN:  Frequency/Duration:  1-2 days per week for 6-8 Weeks:  INTERVENTIONS:  [x] Therapeutic exercise including: strength training, ROM, for Upper extremity and core   [x]  NMR activation and proprioception for UE, scap and Core   [x] Manual therapy as indicated for shoulder, scapula and spine to include: Dry Needling/IASTM, STM, PROM, Gr I-IV mobilizations, manipulation. [x] Modalities as needed that may include: thermal agents, E-stim, Biofeedback, US, iontophoresis as indicated  [x] Patient education on joint protection, postural re-education, activity modification, progression of HEP. HEP instruction: Written HEP instructions provided and reviewed    Access Code: [email or web address]   URL: [email or web address]   Date: 09/22/2020   Prepared by: Cristin Schultz     Exercises   Standing Wrist Flexion Stretch - 3 reps - 30s hold - 1x daily - 7x weekly   Wrist Flexor Stretch in Pronation - 3 reps - 30s hold - 1x daily - 7x

## 2020-09-30 ENCOUNTER — HOSPITAL ENCOUNTER (OUTPATIENT)
Dept: PHYSICAL THERAPY | Age: 50
Setting detail: THERAPIES SERIES
Discharge: HOME | End: 2020-09-30
Payer: COMMERCIAL

## 2020-09-30 PROCEDURE — 97110 THERAPEUTIC EXERCISES: CPT

## 2020-09-30 PROCEDURE — 97140 MANUAL THERAPY 1/> REGIONS: CPT

## 2020-09-30 NOTE — FLOWSHEET NOTE
[address] [facility] Physical Therapy  Phone: [phone]   Fax: [phone]    Physical Therapy Treatment Note/ Progress Report:     Date:  2020    Patient Name:  Apolinar Bryant    :  1970  MRN: [id number]  Restrictions/Precautions:    Medical/Treatment Diagnosis Information:  Diagnosis: M77.11 (ICD-10-CM) - Lateral epicondylitis of right elbow  Treatment Diagnosis: muscle pain with over activation of extensors/flexors of R forearm, noted muscle tension in ECRB, ECU, and FCU   Insurance/Certification information:  PT Insurance Information: BCBS (Auth AIM Req - 20 visits Ping Young renews on 10/1/2020) No Copay 100% coverage after deductible met  Physician Information:  Referring Practitioner: Miko Fernandez MD  Plan of care signed (Y/N): [x]  Yes []  No     Date of Patient follow up with Physician: 10/28/2020     Progress Report: []  Yes  [x]  No     Date Range for reporting period:  Beginnin2020  Ending:     Progress report due (10 Rx/or 30 days whichever is less): visit #10 or  (date)     Recertification due (POC duration/ or 90 days whichever is less): visit #16 or 2020 (8 weeks)     Visit # Insurance Allowable Auth required? Date Range   2 20 visits [facility]   Auth Required [x]  Yes  []  No Renews 10/1/2020     Latex Allergy:  [x]NO      []YES  Preferred Language for Healthcare:   [x]English       []other:    Functional Scale:       Date assessed:  Quick DASH: raw score = 27; dysfunction = 36%  2020    Pain level:  0/10     SUBJECTIVE:  Patient reports she is doing absolutely great. States pain is basically absent. Had small twinge when doing heavy gardening, but otherwise all activities were pain free. She has stayed compliant with HEP with both strengthening and stretching.      OBJECTIVE: See eval   Observation:    Test measurements:      RESTRICTIONS/PRECAUTIONS: n/a    Exercises/Interventions:   Therapeutic Exercise (15577) Resistance / level Sets / Seconds  Reps Notes/Cues          Wrist Flexors Stretch: sup & pro Elbow ext 30\" 2    Wrist Extensor Stretch: sup & pro Elbow ext             Wrist Strengthening against gravity off table: flex/ext/RD/UD 2# 2 10    Supination/pronation with long lever arm from weight  3# 2 10    Therabar Flex/Ext Twist  1 10    OT Wrist Maze x 2  x2 each     Twist Bar w/ TB to roll  x2 each            Therapeutic Activities (73118)       Patient Education                                   Neuromuscular Re-ed (11186)                                          Manual Intervention (45498)       IASTM - Hawk   - HG 7 sweeps/fans to medial/lateral aspect of forearm  - HG 8 sweeps/fans to medial/lateral aspect of forearm  - HG 9 brush/strum ECU, ECRB, and FCU with maintained hold on TP spots  15'     Post Cap mobs       Thoracic/Rib manipualtion       CT MT/Mobs       PROM MT                  Pt. Education:  -pt educated on diagnosis, prognosis and expectations for rehab  -all pt questions were answered    Home Exercise Program:  Access Code: [email or web address]   URL: [email or web address]   Date: 09/22/2020   Prepared by: Evaline Been     Exercises   · Standing Wrist Flexion Stretch - 3 reps - 30s hold - 1x daily - 7x weekly   · Wrist Flexor Stretch in Pronation - 3 reps - 30s hold - 1x daily - 7x weekly   · Standing Wrist Extension Stretch - 3 reps - 30s hold - 1x daily - 7x weekly   · Seated Wrist Flexion with Dumbbell - 10 reps - 3 sets - 1x daily - 7x weekly   · Seated Wrist Extension with Dumbbell - 10 reps - 3 sets - 1x daily - 7x weekly   · Seated Wrist Radial Deviation with Dumbbell - 10 reps - 3 sets - 1x daily - 7x weekly   · Seated Wrist Ulnar Deviation with Dumbbell - 10 reps - 3 sets - 1x daily - 7x weekly   · Seated Wrist Supination Pronation with Can - 10 reps - 3 sets - 1x daily - 7x weekly    Therapeutic Exercise and NMR EXR  [x] (97078) Provided verbal/tactile cueing for activities related to strengthening, flexibility, endurance, ROM  for improvements in scapular, scapulothoracic and UE control with self care, reaching, carrying, lifting, house/yardwork, driving/computer work.    [] (46308) Provided verbal/tactile cueing for activities related to improving balance, coordination, kinesthetic sense, posture, motor skill, proprioception  to assist with  scapular, scapulothoracic and UE control with self care, reaching, carrying, lifting, house/yardwork, driving/computer work.  [] (03098) Therapist is in constant attendance of 2 or more patients providing skilled therapy interventions, but not providing any significant amount of measurable one-on-one time to either patient, for improvements in cervical, scapular, scapulothoracic and UE control with self care, reaching, carrying, lifting, house/yardwork, driving, computer work. Therapeutic Activities:    [x] (55779 or 40512) Provided verbal/tactile cueing for activities related to improving balance, coordination, kinesthetic sense, posture, motor skill, proprioception and motor activation to allow for proper function of scapular, scapulothoracic and UE control with self care, carrying, lifting, driving/computer work.      Home Exercise Program:    [x] (53557) Reviewed/Progressed HEP activities related to strengthening, flexibility, endurance, ROM of scapular, scapulothoracic and UE control with self care, reaching, carrying, lifting, house/yardwork, driving/computer work  [] (42958) Reviewed/Progressed HEP activities related to improving balance, coordination, kinesthetic sense, posture, motor skill, proprioception of scapular, scapulothoracic and UE control with self care, reaching, carrying, lifting, house/yardwork, driving/computer work      Manual Treatments:  PROM / STM / Oscillations-Mobs:  G-I, II, III, IV (PA's, Inf., Post.)  [x] (01145) Provided manual therapy to mobilize soft tissue/joints of cervical/CT, scapular GHJ and UE for the purpose of modulating pain, promoting relaxation,  increasing ROM, reducing/eliminating soft tissue swelling/inflammation/restriction, improving soft tissue extensibility and allowing for proper ROM for normal function with self care, reaching, carrying, lifting, house/yardwork, driving/computer work    Modalities:      Charges:  Timed Code Treatment Minutes: 40   Total Treatment Minutes: 40       [] EVAL - LOW (76829)   [] EVAL - MOD (69305)  [] EVAL - HIGH (92030)  [] RE-EVAL (57069)  [x] VT(98975) x 2      [] Ionto  [] NMR (76801) x      [] Vaso  [x] Manual (75624) x 1      [] Ultrasound:  [] TA x       [] Mech Traction (51885)  [] Home Management Training x    [] ES (un) (40243):   [] Aquatic    [] ES(attended) (48897)  [] Other:                     GOALS:  Patient stated goal: return to normal life/exercise, gain strength   []? Progressing: []? Met: []? Not Met: []? Adjusted     Therapist goals for Patient:   Short Term Goals: To be achieved in: 2 weeks  1. Independent in HEP and progression per patient tolerance, in order to prevent re-injury. []? Progressing: []? Met: []? Not Met: []? Adjusted  2. Patient will have a decrease in pain to facilitate improvement in movement, function, and ADLs as indicated by Functional Deficits. []? Progressing: []? Met: []? Not Met: []? Adjusted     Long Term Goals: To be achieved in: 8 weeks  1. Disability index score of 0% or less for the UEFI or Quick DASH to assist with reaching prior level of function. []? Progressing: []? Met: []? Not Met: []? Adjusted  2. Patient will demonstrate increased AROM to full wrist ROM in all directions w/o pain to allow for proper joint functioning as indicated by patients Functional Deficits. []? Progressing: []? Met: []? Not Met: []? Adjusted  3. Patient will demonstrate an increase in Strength to 5/5 to allow for proper functional mobility as indicated by patients Functional Deficits. []? Progressing: []? Met: []? Not Met: []? Adjusted  4.  Patient will return to functional activities including performing work duties such as driving and computer work without increased symptoms or restriction. []? Progressing: []? Met: []? Not Met: []? Adjusted  5. Patient will be able to return to regular workouts including long duration walking, light weight lifting, and exercise routines without restriction. []? Progressing: []? Met: []? Not Met: []? Adjusted    Overall Progression Towards Functional goals/ Treatment Progress Update:  [] Patient is progressing as expected towards functional goals listed. [] Progression is slowed due to complexities/Impairments listed. [] Progression has been slowed due to co-morbidities. [x] Plan just implemented, too soon to assess goals progression <30days   [] Goals require adjustment due to lack of progress  [] Patient is not progressing as expected and requires additional follow up with physician  [] Other    Persisting Functional Limitations/Impairments:  []Sitting []Standing   []Transfers  []Sleeping   [x]Reaching [x]Lifting   [x]ADLs [x]Housework  [x]Driving [x]Job related tasks  []Sports/Recreation []Other:    ASSESSMENT:  Patient responded excellent to IASTM from eval, continued today to decrease last remaining muscle tissue stiffness. Pt does well with increase in therex without any discomfort. Pt to return in 2 weeks if needed for further progressions, otherwise is doing great. Pt understanding and will stay compliant with HEP. Treatment/Activity Tolerance:  [x] Pt able to complete treatment [] Patient limited by fatique  [] Patient limited by pain  [] Patient limited by other medical complications  [] Other:     Prognosis:  [x] Good [] Fair  [] Poor    Patient Requires Follow-up: [x] Yes  [] No    Return to Play:    [x]  N/A    PLAN: See eval. PT 1-2x / week for 6-8 weeks.   [x] Continue per plan of care [] Alter current plan (see comments)  [] Plan of care initiated [] Hold pending MD visit [] Discharge    Electronically signed by: Mauricio Champagne PT, DPT      Note: If patient does not return for scheduled/ recommended follow up visits, this note will serve as a discharge from care along with most recent update on progress.

## 2020-10-07 ENCOUNTER — APPOINTMENT (OUTPATIENT)
Dept: PHYSICAL THERAPY | Age: 50
End: 2020-10-07
Payer: COMMERCIAL

## 2020-10-14 ENCOUNTER — HOSPITAL ENCOUNTER (OUTPATIENT)
Dept: PHYSICAL THERAPY | Age: 50
Setting detail: THERAPIES SERIES
Discharge: HOME | End: 2020-10-14
Payer: COMMERCIAL

## 2020-10-14 PROCEDURE — 97140 MANUAL THERAPY 1/> REGIONS: CPT

## 2020-10-14 PROCEDURE — 97110 THERAPEUTIC EXERCISES: CPT

## 2020-10-21 ENCOUNTER — APPOINTMENT (OUTPATIENT)
Dept: PHYSICAL THERAPY | Age: 50
End: 2020-10-21
Payer: COMMERCIAL

## 2020-12-07 ENCOUNTER — NURSE TRIAGE (OUTPATIENT)
Dept: OTHER | Facility: CLINIC | Age: 50
End: 2020-12-07

## 2020-12-07 ENCOUNTER — OFFICE VISIT (OUTPATIENT)
Dept: FAMILY MEDICINE CLINIC | Age: 50
End: 2020-12-07
Payer: COMMERCIAL

## 2020-12-07 ENCOUNTER — HOSPITAL ENCOUNTER (OUTPATIENT)
Age: 50
Discharge: HOME | End: 2020-12-07
Payer: COMMERCIAL

## 2020-12-07 VITALS
DIASTOLIC BLOOD PRESSURE: 82 MMHG | BODY MASS INDEX: 23.82 KG/M2 | WEIGHT: 138.8 LBS | SYSTOLIC BLOOD PRESSURE: 120 MMHG | OXYGEN SATURATION: 98 % | TEMPERATURE: 98.4 F | HEART RATE: 95 BPM

## 2020-12-07 LAB
BASOPHILS ABSOLUTE: 0.1 K/UL (ref 0–0.2)
BASOPHILS RELATIVE PERCENT: 1.2 %
EOSINOPHILS ABSOLUTE: 0.1 K/UL (ref 0–0.6)
EOSINOPHILS RELATIVE PERCENT: 0.9 %
HCT VFR BLD CALC: 41.1 % (ref 36–48)
HEMOGLOBIN: 14 G/DL (ref 12–16)
LYMPHOCYTES ABSOLUTE: 0.6 K/UL (ref 1–5.1)
LYMPHOCYTES RELATIVE PERCENT: 10 %
MCH RBC QN AUTO: 31 PG (ref 26–34)
MCHC RBC AUTO-ENTMCNC: 34 G/DL (ref 31–36)
MCV RBC AUTO: 91.3 FL (ref 80–100)
MONOCYTES ABSOLUTE: 0.4 K/UL (ref 0–1.3)
MONOCYTES RELATIVE PERCENT: 6 %
NEUTROPHILS ABSOLUTE: 5.3 K/UL (ref 1.7–7.7)
NEUTROPHILS RELATIVE PERCENT: 81.9 %
PDW BLD-RTO: 12.2 % (ref 12.4–15.4)
PLATELET # BLD: 282 K/UL (ref 135–450)
PMV BLD AUTO: 8.6 FL (ref 5–10.5)
RBC # BLD: 4.5 M/UL (ref 4–5.2)
TSH REFLEX: 1.97 UIU/ML (ref 0.27–4.2)
WBC # BLD: 6.4 K/UL (ref 4–11)

## 2020-12-07 PROCEDURE — 93000 ELECTROCARDIOGRAM COMPLETE: CPT | Performed by: NURSE PRACTITIONER

## 2020-12-07 PROCEDURE — 85025 COMPLETE CBC W/AUTO DIFF WBC: CPT

## 2020-12-07 PROCEDURE — 84443 ASSAY THYROID STIM HORMONE: CPT

## 2020-12-07 PROCEDURE — 36415 COLL VENOUS BLD VENIPUNCTURE: CPT

## 2020-12-07 PROCEDURE — 99214 OFFICE O/P EST MOD 30 MIN: CPT | Performed by: NURSE PRACTITIONER

## 2020-12-07 RX ORDER — LORAZEPAM 0.5 MG/1
0.5 TABLET ORAL PRN
COMMUNITY
End: 2021-04-20 | Stop reason: SDUPTHER

## 2020-12-07 NOTE — PATIENT INSTRUCTIONS

## 2020-12-07 NOTE — PROGRESS NOTES
Bonni Sicard  : 1970  Encounter date: 2020    This is a 48 y.o. female who presents with  Chief Complaint   Patient presents with    Palpitations     3 times since the start of November. History of present illness:    HPI   1. Presents to clinic today with concerns for heart palpitations that started approximately 4 weeks prior. Reports 3 episodes since starting. Starts with rapid heart rate - at rest.  Reports takes her breath away, gets heart palpitations, gets hot, feels light headed. States will last 15-30 minutes. Reports episodes will start when she is at rest - last night awoke her out of sleep. Reports 112-118 pulse rate recordings on FitBit. Reports at very first episode had associated dizziness and diarrhea - possible due to anxiety. Reports extreme personal stressors along with personal anxiety - COVID has made it worse. States initially thought was anxiety and day before Thanksgiving, but last evening reports was having a good day overall - and out of no where feels as though things came on. Took Ativan last night before bed which was helpful. Initial episode 8:30 - took Ativan and then awoke with the second episode 12:30.    2. Father did have Afib - dx in late 62s, early 76s. Allergies   Allergen Reactions    Adhesive Tape     Levaquin [Levofloxacin In D5w]      Panic attack, hot flashes     Current Outpatient Medications   Medication Sig Dispense Refill    LORazepam (ATIVAN) 0.5 MG tablet Take 0.5 mg by mouth as needed for Anxiety.  amitriptyline (ELAVIL) 10 MG tablet 1-2 at night as needed for sleep 60 tablet 5    Multiple Vitamins-Minerals (THERAPEUTIC MULTIVITAMIN-MINERALS) tablet Take 1 tablet by mouth daily      Cyanocobalamin (VITAMIN B 12) 100 MCG LOZG Take 1 tablet by mouth as needed      diphenhydrAMINE (BENADRYL) 25 MG tablet Take 25 mg by mouth as needed for Itching       No current facility-administered medications for this visit.        Review of 0.5 mg by mouth as needed for Anxiety. Jefferson Wilson was counseled regarding symptoms of current diagnosis, course and complications of disease if inadequately treated. Discussed side effects of medications, diagnosis, treatment options, and prognosis along with risks, benefits, complications, and alternatives of treatment including labs, imaging and other studies/treatment targets and goals. She verbalized understanding of instructions and counseling. Return if symptoms worsen or fail to improve.

## 2020-12-08 ENCOUNTER — TELEPHONE (OUTPATIENT)
Dept: FAMILY MEDICINE CLINIC | Age: 50
End: 2020-12-08

## 2020-12-16 ASSESSMENT — ENCOUNTER SYMPTOMS
SHORTNESS OF BREATH: 0
VOMITING: 0
COUGH: 0
NAUSEA: 0
DIARRHEA: 0

## 2021-02-11 ENCOUNTER — HOSPITAL ENCOUNTER (OUTPATIENT)
Dept: WOMENS IMAGING | Age: 51
Discharge: HOME | End: 2021-02-11
Payer: COMMERCIAL

## 2021-02-11 DIAGNOSIS — Z12.31 BREAST CANCER SCREENING BY MAMMOGRAM: ICD-10-CM

## 2021-02-11 PROCEDURE — 77063 BREAST TOMOSYNTHESIS BI: CPT

## 2021-04-16 DIAGNOSIS — F41.9 ANXIETY: ICD-10-CM

## 2021-04-16 RX ORDER — LORAZEPAM 0.5 MG/1
0.5 TABLET ORAL NIGHTLY PRN
Qty: 30 TABLET | Refills: 0 | OUTPATIENT
Start: 2021-04-16 | End: 2021-05-16

## 2021-04-16 NOTE — TELEPHONE ENCOUNTER
Medication:   Requested Prescriptions      No prescriptions requested or ordered in this encounter      Last Filled:  7/21/20    Patient Phone Number: [phone] (home) [phone] (work)    Last appt: 12/7/2020   Next appt: 9/16/2021    Last OARRS:   RX Monitoring 9/14/2020   Periodic Controlled Substance Monitoring No signs of potential drug abuse or diversion identified.      PDMP Monitoring:    Last PDMP Galdino Angel as Reviewed formerly [facility]):  Review User Review Instant Review Result   Juancho Everett 9/14/2020  7:45 AM Reviewed PDMP [1]     Preferred Pharmacy:   [facility] [address], [address] [facility] [phone] Dany Bowen [phone] [address] [facility] [place] [address]  Phone: [phone] Fax: [phone]

## 2021-04-16 NOTE — TELEPHONE ENCOUNTER
LORazepam (ATIVAN) 0.5 MG tablet [[id number]    [address], [address]       Please call her @ [phone] when it is called in

## 2021-04-20 RX ORDER — LORAZEPAM 0.5 MG/1
0.5 TABLET ORAL PRN
Qty: 11 TABLET | Refills: 0 | Status: SHIPPED | OUTPATIENT
Start: 2021-04-24 | End: 2021-09-24 | Stop reason: SDUPTHER

## 2021-04-20 NOTE — TELEPHONE ENCOUNTER
Patient is scheduled on 5/13 and only has 2 pills left. Is it possible that the patient can get enough till her appt? And also if its possible for someone to call her back to let her know. She would had appreciated if someone would have called her back to let her know that she needs an appt and this medication was denied due to her needing appt.         Please Myke Nesbitt

## 2021-05-09 DIAGNOSIS — F41.9 ANXIETY: ICD-10-CM

## 2021-05-10 ENCOUNTER — TELEPHONE (OUTPATIENT)
Dept: FAMILY MEDICINE CLINIC | Age: 51
End: 2021-05-10

## 2021-05-10 RX ORDER — AMITRIPTYLINE HYDROCHLORIDE 10 MG/1
TABLET, FILM COATED ORAL
Qty: 60 TABLET | Refills: 4 | Status: SHIPPED | OUTPATIENT
Start: 2021-05-10 | End: 2021-09-24 | Stop reason: SDUPTHER

## 2021-05-13 ENCOUNTER — OFFICE VISIT (OUTPATIENT)
Dept: FAMILY MEDICINE CLINIC | Age: 51
End: 2021-05-13
Payer: COMMERCIAL

## 2021-05-13 VITALS
OXYGEN SATURATION: 98 % | BODY MASS INDEX: 23.48 KG/M2 | HEART RATE: 80 BPM | SYSTOLIC BLOOD PRESSURE: 90 MMHG | DIASTOLIC BLOOD PRESSURE: 60 MMHG | WEIGHT: 136.8 LBS | TEMPERATURE: 98.2 F

## 2021-05-13 DIAGNOSIS — F41.9 ANXIETY: Primary | ICD-10-CM

## 2021-05-13 DIAGNOSIS — F51.01 PRIMARY INSOMNIA: ICD-10-CM

## 2021-05-13 DIAGNOSIS — R00.2 PALPITATION: ICD-10-CM

## 2021-05-13 PROCEDURE — 99214 OFFICE O/P EST MOD 30 MIN: CPT | Performed by: FAMILY MEDICINE

## 2021-05-13 RX ORDER — LORAZEPAM 0.5 MG/1
0.5 TABLET ORAL PRN
Qty: 30 TABLET | Refills: 0 | Status: CANCELLED | OUTPATIENT
Start: 2021-05-13 | End: 2021-06-12

## 2021-05-13 ASSESSMENT — PATIENT HEALTH QUESTIONNAIRE - PHQ9
SUM OF ALL RESPONSES TO PHQ QUESTIONS 1-9: 0
2. FEELING DOWN, DEPRESSED OR HOPELESS: 0
SUM OF ALL RESPONSES TO PHQ QUESTIONS 1-9: 0

## 2021-05-13 NOTE — PROGRESS NOTES
Patient is here for refills for Insomnia and anxiety. Takes lorazepam rarely for anxiety. Sometimes if not sleeping well. Last filled 30 tabs in July 2020. Has been under more stress recently so has needed for sleep more. States really takes for sleep mainly as when sleeps better handles anxiety better. Still getting palpitations, states comes out of no where and feels heart racing and feels like a heat sensation and wants to go outside and cool off or stand a refrigerator. States only having 5 or 6 times a year and only once since December. Does not break out in sweat. Feels lightheaded with it. Lasts about 1-2 minutes. Wonders if related to menopause. States gets times when not feeling anxious at all. No issues when exercises. Vitals:    05/13/21 1118   BP: 90/60   Site: Left Upper Arm   Position: Sitting   Cuff Size: Medium Adult   Pulse: 80   Temp: 98.2 °F (36.8 °C)   TempSrc: Infrared   SpO2: 98%   Weight: 136 lb 12.8 oz (62.1 kg)     Wt Readings from Last 3 Encounters:   05/13/21 136 lb 12.8 oz (62.1 kg)   12/07/20 138 lb 12.8 oz (63 kg)   09/18/20 140 lb (63.5 kg)     Body mass index is 23.48 kg/m². PHQ Scores 5/13/2021 7/21/2020 9/12/2019 8/15/2018   PHQ2 Score 0 0 0 0   PHQ9 Score 0 0 0 0           GEN: Alert and oriented x 4 NAD, affect appropriate and normal appearing weight, well hydrated, well developed. ASSESSMENT AND PLAN:       Anabelle Peters was seen today for insomnia. Diagnoses and all orders for this visit:    Anxiety  -Stable, continue current medications. Takes alprazolam rarely  Controlled Substances Monitoring:   OARRS report reviewed  Periodic Controlled Substance Monitoring: No signs of potential drug abuse or diversion identified. Fran Bone MD)        Primary insomnia  -Stable, continue current medications.     Palpitation  May be due to hot flashes  Monitor  If worsening needs event monitor but having very infrequently currently  Labs and EKG done in Dec ok

## 2021-08-25 ENCOUNTER — TELEPHONE (OUTPATIENT)
Dept: FAMILY MEDICINE CLINIC | Age: 51
End: 2021-08-25

## 2021-08-25 DIAGNOSIS — Z00.00 WELL ADULT EXAM: Primary | ICD-10-CM

## 2021-08-25 DIAGNOSIS — E78.00 PURE HYPERCHOLESTEROLEMIA: ICD-10-CM

## 2021-08-25 NOTE — TELEPHONE ENCOUNTER
I called and LVM for pt to call back in regards to her upcoming appt with Dr. Radha Yeh on 9/22/21. That appt needs canceled and rescheduled due to Dr. Radha Yeh will not be in the office on that day. Physical can be scheduled for any date after 9/14 her last physical was on 9/14/20, physical's must be a year apart for insurance to cover the appt.

## 2021-08-25 NOTE — TELEPHONE ENCOUNTER
----- Message from Jordana Alfarolander sent at 8/25/2021  3:18 PM EDT -----  Subject: Referral Request    QUESTIONS   Reason for referral request? Patient is requesting bloodwork be ordered   for annual physical, specifically her cholesterol and whatever other labs   she is due for. Has the physician seen you for this condition before? No   Preferred Specialist (if applicable)? Do you already have an appointment scheduled? Additional Information for Provider?   ---------------------------------------------------------------------------  --------------  CALL BACK INFO  What is the best way for the office to contact you? OK to leave message on   voicemail  Preferred Call Back Phone Number?  [phone]

## 2021-09-04 RX ORDER — FLUCONAZOLE 150 MG/1
150 TABLET ORAL ONCE
Qty: 1 TABLET | Refills: 0 | Status: SHIPPED | OUTPATIENT
Start: 2021-09-04 | End: 2021-09-04

## 2021-09-08 ENCOUNTER — OFFICE VISIT (OUTPATIENT)
Dept: FAMILY MEDICINE CLINIC | Age: 51
End: 2021-09-08
Payer: COMMERCIAL

## 2021-09-08 VITALS
TEMPERATURE: 98.8 F | OXYGEN SATURATION: 98 % | BODY MASS INDEX: 24.03 KG/M2 | HEART RATE: 74 BPM | DIASTOLIC BLOOD PRESSURE: 70 MMHG | WEIGHT: 140 LBS | SYSTOLIC BLOOD PRESSURE: 124 MMHG

## 2021-09-08 DIAGNOSIS — T14.8XXA EXCORIATION: Primary | ICD-10-CM

## 2021-09-08 PROCEDURE — 99213 OFFICE O/P EST LOW 20 MIN: CPT | Performed by: NURSE PRACTITIONER

## 2021-09-08 NOTE — PROGRESS NOTES
Eneida Cano  : 1970  Encounter date: 2021    This neftali 46 y.o. female who presents with  Chief Complaint   Patient presents with    Hemorrhoids     feeling discomfort x 1week       History of present illness:    HPI Pt is 46year old female with complaints of irritation, burning and itching in groin area and area near vagina. Pt denies burning with urination, discharge, denies pain with defecation or constipation. Reports recently started running. Has taken prescribed diflucan, applied topical preparation H and TUCKS medicated pads with little relief. Denies history of hemorrhoids, reports anal fissures in past.  Denies blood in stool. Current Outpatient Medications on File Prior to Visit   Medication Sig Dispense Refill    amitriptyline (ELAVIL) 10 MG tablet TAKE ONE TO TWO TABLETS BY MOUTH ONCE NIGHTLY AS NEEDED FOR SLEEP 60 tablet 4    Cyanocobalamin (VITAMIN B 12) 100 MCG LOZG Take 1 tablet by mouth as needed      diphenhydrAMINE (BENADRYL) 25 MG tablet Take 25 mg by mouth as needed for Itching      Multiple Vitamins-Minerals (THERAPEUTIC MULTIVITAMIN-MINERALS) tablet Take 1 tablet by mouth daily (Patient not taking: Reported on 2021)       No current facility-administered medications on file prior to visit.       Allergies   Allergen Reactions    Adhesive Tape     Levaquin [Levofloxacin In D5w]      Panic attack, hot flashes     Past Medical History:   Diagnosis Date    Allergic rhinitis     Panic attacks       Past Surgical History:   Procedure Laterality Date    ABDOMINAL EXPLORATION SURGERY  3/12/13    BREAST SURGERY Right     for blocked duct    COLONOSCOPY  2013    repeat in 5-7 years, [place]    HIP SURGERY Left     labral tear, femur acetabullar impigement    HYSTERECTOMY, TOTAL ABDOMINAL      ovaries still there, done for fibroid and pre-cancerous cervical cells      Family History   Problem Relation Age of Onset    Heart Disease Mother         congenital    Kidney Disease Mother         stones    Cancer Father         melanoma    Prostate Cancer Father     Atrial Fibrillation Father     Heart Disease Maternal Grandmother     Cancer Maternal Grandfather         liver    Alcohol Abuse Maternal Grandfather     Depression Paternal Grandmother     Depression Paternal Grandfather     No Known Problems Brother     Heart Disease Maternal Aunt       Social History     Tobacco Use    Smoking status: Never Smoker    Smokeless tobacco: Never Used   Substance Use Topics    Alcohol use: Yes     Comment: 3-5 drinks a week        Review of Systems    Objective:    /70 (Site: Left Upper Arm, Position: Sitting, Cuff Size: Medium Adult)   Pulse 74   Temp 98.8 °F (37.1 °C)   Wt 140 lb (63.5 kg)   SpO2 98%   BMI 24.03 kg/m²   Weight: 140 lb (63.5 kg)     BP Readings from Last 3 Encounters:   09/08/21 124/70   05/13/21 90/60   12/07/20 120/82     Wt Readings from Last 3 Encounters:   09/08/21 140 lb (63.5 kg)   05/13/21 136 lb 12.8 oz (62.1 kg)   12/07/20 138 lb 12.8 oz (63 kg)     BMI Readings from Last 3 Encounters:   09/08/21 24.03 kg/m²   05/13/21 23.48 kg/m²   12/07/20 23.82 kg/m²       Physical Exam  Vitals reviewed. Constitutional:       Appearance: Normal appearance. She is well-developed. Cardiovascular:      Rate and Rhythm: Normal rate and regular rhythm. Heart sounds: Normal heart sounds. No murmur heard. Pulmonary:      Effort: Pulmonary effort is normal.      Breath sounds: Normal breath sounds. Genitourinary:     General: Normal vulva. Vagina: No vaginal discharge. Rectum: Normal.          Comments: Area of excoriation, no visible hemorrhoids, discharge or lesions  Skin:     General: Skin is warm and dry. Neurological:      Mental Status: She is alert and oriented to person, place, and time. Assessment/Plan    1.  Excoriation  Advised avoid medication topicals  Suggested warm salt water soaks  Topical vaseline with loose fitting clothing  Suggested OTC probiotics  Advised follow up with GYN if no resolve      Return if symptoms worsen or fail to improve, for unresolved symptoms. This dictation was generated by voice recognition computer software. Although all attempts are made to edit the dictation for accuracy, there may be errors in the transcription that are not intended.

## 2021-09-17 ENCOUNTER — HOSPITAL ENCOUNTER (OUTPATIENT)
Age: 51
Discharge: HOME | End: 2021-09-17
Payer: COMMERCIAL

## 2021-09-17 DIAGNOSIS — Z00.00 WELL ADULT EXAM: ICD-10-CM

## 2021-09-17 DIAGNOSIS — E78.00 PURE HYPERCHOLESTEROLEMIA: ICD-10-CM

## 2021-09-17 LAB
A/G RATIO: 1.7 (ref 1.1–2.2)
ALBUMIN SERPL-MCNC: 4.4 G/DL (ref 3.4–5)
ALP BLD-CCNC: 68 U/L (ref 40–129)
ALT SERPL-CCNC: 12 U/L (ref 10–40)
ANION GAP SERPL CALCULATED.3IONS-SCNC: 14 MMOL/L (ref 3–16)
AST SERPL-CCNC: 17 U/L (ref 15–37)
BILIRUB SERPL-MCNC: 0.5 MG/DL (ref 0–1)
BUN BLDV-MCNC: 16 MG/DL (ref 7–20)
CALCIUM SERPL-MCNC: 9.4 MG/DL (ref 8.3–10.6)
CHLORIDE BLD-SCNC: 106 MMOL/L (ref 99–110)
CHOLESTEROL, TOTAL: 214 MG/DL (ref 0–199)
CO2: 22 MMOL/L (ref 21–32)
CREAT SERPL-MCNC: 0.9 MG/DL (ref 0.6–1.1)
GFR AFRICAN AMERICAN: >60
GFR NON-AFRICAN AMERICAN: >60
GLOBULIN: 2.6 G/DL
GLUCOSE BLD-MCNC: 92 MG/DL (ref 70–99)
HDLC SERPL-MCNC: 47 MG/DL (ref 40–60)
LDL CHOLESTEROL CALCULATED: 144 MG/DL
POTASSIUM SERPL-SCNC: 4.6 MMOL/L (ref 3.5–5.1)
SODIUM BLD-SCNC: 142 MMOL/L (ref 136–145)
TOTAL PROTEIN: 7 G/DL (ref 6.4–8.2)
TRIGL SERPL-MCNC: 115 MG/DL (ref 0–150)
VLDLC SERPL CALC-MCNC: 23 MG/DL

## 2021-09-17 PROCEDURE — 36415 COLL VENOUS BLD VENIPUNCTURE: CPT

## 2021-09-17 PROCEDURE — 80053 COMPREHEN METABOLIC PANEL: CPT

## 2021-09-17 PROCEDURE — 80061 LIPID PANEL: CPT

## 2021-09-23 NOTE — PROGRESS NOTES
Here for annual physical.    Dental: up-to-date  Eye: up-to-date    Colonoscopy: up-to-date    Pap: up-to-date  Mammo: up-to-date      Exercise: walking, running, and light weights  Diet: 3 meals daily, diet in the middle    Living Will: Yes,   Copy on file    [facility] Scores 9/24/2021 5/13/2021 7/21/2020 9/12/2019 8/15/2018   PHQ2 Score 0 0 0 0 0   PHQ9 Score 0 0 0 0 0     Takes amtriptyline nightly, usually only one but sometimes nightly. Using ativan rarely, maybe once a month. Works well when needed. Still palpitations, last seconds. Last time had in June. Really feels like hot flashes. HM reviewed with pt    Patient's medications, allergies, past medical, surgical, social and family histories were reviewed and updated in the EHR as appropriate. Vitals:    09/24/21 0824   BP: 110/60   Site: Right Upper Arm   Position: Sitting   Cuff Size: Medium Adult   Pulse: 72   Temp: 98 °F (36.7 °C)   TempSrc: Infrared   SpO2: 99%   Weight: 138 lb 3.2 oz (62.7 kg)   Height: 5' 4.5\" (1.638 m)     Wt Readings from Last 3 Encounters:   09/24/21 138 lb 3.2 oz (62.7 kg)   09/08/21 140 lb (63.5 kg)   05/13/21 136 lb 12.8 oz (62.1 kg)     Body mass index is 23.36 kg/m². GENERAL Alert and oriented x 4 NAD, affect appropriate and normal appearing weight, well hydrated, well developed.   NECK:supple and non tender without mass, no thyromegaly or thyroid nodules, no cervical lymphadenopathy  LUNG:clear to auscultation bilaterally with normal respiratory effort  CV: Normal heart sounds, regular rate and rhythm without murmurs  EXTREMETY: no loss of hair, no edema, normal pedal pulses bilaterally  NEURO: CN grossly intact, moving all extremities equally, no gross deficits          ASSESSMENT AND PLAN:       Homa Estes was seen today for annual exam.    Diagnoses and all orders for this visit:    Well adult exam  Recommended screenings discussed and ordered if patient agreed  Recommended vaccinations discussed and ordered if patient agreed  Encouraged healthy diet   Encouraged regular exercise and maintaining a healthy weight  Discussed living will/healthcare POA and encouraged to get if doesn't have. Anxiety    -     LORazepam (ATIVAN) 0.5 MG tablet; Take 1 tablet by mouth daily as needed for Anxiety for up to 30 days. Controlled Substances Monitoring:   [place]ARRS report reviewed  Periodic Controlled Substance Monitoring: No signs of potential drug abuse or diversion identified. Timo Dyer MD)        Primary insomnia  -Stable, continue current medications. -     amitriptyline (ELAVIL) 10 MG tablet; TAKE ONE TO TWO TABLETS BY MOUTH ONCE NIGHTLY AS NEEDED FOR SLEEP    Pure hypercholesterolemia  Discussed labs, improving, continue to work on TLC          Return in about 1 year (around 9/24/2022) for 30 min, Well.          Portions of Note per  Chon Kearney CMA AAMA with corrections and edits per Timo Dyer MD.  I agree with entirety of note and was present and performed history and physical.  I also confirm that the note above accurately reflects all work, treatment, procedures, and medical decision making performed by me, Timo Dyer MD

## 2021-09-24 ENCOUNTER — OFFICE VISIT (OUTPATIENT)
Dept: FAMILY MEDICINE CLINIC | Age: 51
End: 2021-09-24
Payer: COMMERCIAL

## 2021-09-24 VITALS
TEMPERATURE: 98 F | BODY MASS INDEX: 23.03 KG/M2 | HEIGHT: 65 IN | HEART RATE: 72 BPM | DIASTOLIC BLOOD PRESSURE: 60 MMHG | WEIGHT: 138.2 LBS | SYSTOLIC BLOOD PRESSURE: 110 MMHG | OXYGEN SATURATION: 99 %

## 2021-09-24 DIAGNOSIS — F51.01 PRIMARY INSOMNIA: ICD-10-CM

## 2021-09-24 DIAGNOSIS — F41.9 ANXIETY: ICD-10-CM

## 2021-09-24 DIAGNOSIS — E78.00 PURE HYPERCHOLESTEROLEMIA: ICD-10-CM

## 2021-09-24 DIAGNOSIS — Z00.00 WELL ADULT EXAM: Primary | ICD-10-CM

## 2021-09-24 PROCEDURE — 99396 PREV VISIT EST AGE 40-64: CPT | Performed by: FAMILY MEDICINE

## 2021-09-24 RX ORDER — LORAZEPAM 0.5 MG/1
0.5 TABLET ORAL DAILY PRN
Qty: 10 TABLET | Refills: 0 | Status: SHIPPED | OUTPATIENT
Start: 2021-09-24 | End: 2022-03-04 | Stop reason: SDUPTHER

## 2021-09-24 RX ORDER — AMITRIPTYLINE HYDROCHLORIDE 10 MG/1
TABLET, FILM COATED ORAL
Qty: 60 TABLET | Refills: 12 | Status: SHIPPED | OUTPATIENT
Start: 2021-09-24 | End: 2022-09-14 | Stop reason: SDUPTHER

## 2021-09-24 RX ORDER — MELOXICAM 15 MG/1
15 TABLET ORAL PRN
COMMUNITY
Start: 2021-08-13 | End: 2022-03-03

## 2021-09-24 ASSESSMENT — PATIENT HEALTH QUESTIONNAIRE - PHQ9
SUM OF ALL RESPONSES TO PHQ QUESTIONS 1-9: 0
SUM OF ALL RESPONSES TO PHQ QUESTIONS 1-9: 0
2. FEELING DOWN, DEPRESSED OR HOPELESS: 0
1. LITTLE INTEREST OR PLEASURE IN DOING THINGS: 0
SUM OF ALL RESPONSES TO PHQ QUESTIONS 1-9: 0
SUM OF ALL RESPONSES TO PHQ9 QUESTIONS 1 & 2: 0

## 2021-09-24 NOTE — PATIENT INSTRUCTIONS
Patient Education        Well Visit, Women 48 to 72: Care Instructions  Overview     Well visits can help you stay healthy. Your doctor has checked your overall health and may have suggested ways to take good care of yourself. Your doctor also may have recommended tests. At home, you can help prevent illness with healthy eating, regular exercise, and other steps. Follow-up care is a key part of your treatment and safety. Be sure to make and go to all appointments, and call your doctor if you are having problems. It's also a good idea to know your test results and keep a list of the medicines you take. How can you care for yourself at home? · Get screening tests that you and your doctor decide on. Screening helps find diseases before any symptoms appear. · Eat healthy foods. Choose fruits, vegetables, whole grains, protein, and low-fat dairy foods. Limit fat, especially saturated fat. Reduce salt in your diet. · Limit alcohol. Have no more than 1 drink a day or 7 drinks a week. · Get at least 30 minutes of exercise on most days of the week. Walking is a good choice. You also may want to do other activities, such as running, swimming, cycling, or playing tennis or team sports. · Reach and stay at a healthy weight. This will lower your risk for many problems, such as obesity, diabetes, heart disease, and high blood pressure. · Do not smoke. Smoking can make health problems worse. If you need help quitting, talk to your doctor about stop-smoking programs and medicines. These can increase your chances of quitting for good. · Care for your mental health. It is easy to get weighed down by worry and stress. Learn strategies to manage stress, like deep breathing and mindfulness, and stay connected with your family and community. If you find you often feel sad or hopeless, talk with your doctor. Treatment can help.   · Talk to your doctor about whether you have any risk factors for sexually transmitted infections (STIs). You can help prevent STIs if you wait to have sex with a new partner (or partners) until you've each been tested for STIs. It also helps if you use condoms (male or female condoms) and if you limit your sex partners to one person who only has sex with you. Vaccines are available for some STIs. · If you think you may have a problem with alcohol or drug use, talk to your doctor. This includes prescription medicines (such as amphetamines and opioids) and illegal drugs (such as cocaine and methamphetamine). Your doctor can help you figure out what type of treatment is best for you. · Protect your skin from too much sun. When you're outdoors from 10 a.m. to 4 p.m., stay in the shade or cover up with clothing and a hat with a wide brim. Wear sunglasses that block UV rays. Even when it's cloudy, put broad-spectrum sunscreen (SPF 30 or higher) on any exposed skin. · See a dentist one or two times a year for checkups and to have your teeth cleaned. · Wear a seat belt in the car. When should you call for help? Watch closely for changes in your health, and be sure to contact your doctor if you have any problems or symptoms that concern you. Where can you learn more? Go to [email or web address] and sign in to your [employer] account. Enter C391 in the [facility] box to learn more about \"Well Visit, Women 50 to 72: Care Instructions. \"     If you do not have an account, please click on the \"Sign Up Now\" link. Current as of: February 11, 2021               Content Version: 13.0  © 3773-5632 Healthwise, Incorporated. Care instructions adapted under license by [facility] ([facility]). If you have questions about a medical condition or this instruction, always ask your healthcare professional. [place] [address] any warranty or liability for your use of this information.

## 2021-11-28 ENCOUNTER — TELEPHONE (OUTPATIENT)
Dept: FAMILY MEDICINE CLINIC | Age: 51
End: 2021-11-28

## 2021-12-03 ENCOUNTER — TELEPHONE (OUTPATIENT)
Dept: FAMILY MEDICINE CLINIC | Age: 51
End: 2021-12-03

## 2021-12-03 NOTE — LETTER
[facility] [place] [address]  Phone: [phone]  Fax: [phone] Meagan Castro, APRN      December 3, 2021     Patient: Sailaja Silva   YOB: 1970     To Whom It May Concern: It is my medical opinion that Star Ramirez may return to work on 12/6/21. Tested positive for COVID with a home test on 11/28/21. Symptoms started 11/26/21. If you have any questions or concerns, please don't hesitate to call.     Sincerely,        Chris Lawrence, APRN

## 2021-12-03 NOTE — TELEPHONE ENCOUNTER
Letter completed. Second covid test is not recommended as this can remain positive even after symptoms are improved. She should get her booster 90 days after symptom onset.

## 2021-12-03 NOTE — TELEPHONE ENCOUNTER
Patient called to find out about a return to work letter.  And speak with you about how she is feeling      Please advise and give her a callback

## 2021-12-03 NOTE — TELEPHONE ENCOUNTER
PT is ready to go to back work on Monday and needs a return to work note to be emailed to [email or web address]. [email or web address]) PT still has congestion she has minimal taste, no smell, no fever and still a little tired. . PT would like to know if she should take another Covid test and  when to take the booster?

## 2022-03-03 ENCOUNTER — APPOINTMENT (OUTPATIENT)
Dept: GENERAL RADIOLOGY | Age: 52
End: 2022-03-03
Payer: COMMERCIAL

## 2022-03-03 ENCOUNTER — HOSPITAL ENCOUNTER (EMERGENCY)
Age: 52
Discharge: HOME | End: 2022-03-03
Payer: COMMERCIAL

## 2022-03-03 ENCOUNTER — NURSE TRIAGE (OUTPATIENT)
Dept: OTHER | Facility: CLINIC | Age: 52
End: 2022-03-03

## 2022-03-03 VITALS
OXYGEN SATURATION: 98 % | BODY MASS INDEX: 23.73 KG/M2 | DIASTOLIC BLOOD PRESSURE: 68 MMHG | WEIGHT: 139 LBS | TEMPERATURE: 97.9 F | SYSTOLIC BLOOD PRESSURE: 109 MMHG | HEART RATE: 80 BPM | RESPIRATION RATE: 16 BRPM | HEIGHT: 64 IN

## 2022-03-03 DIAGNOSIS — R07.9 CHEST PAIN, UNSPECIFIED TYPE: Primary | ICD-10-CM

## 2022-03-03 LAB
A/G RATIO: 1.8 (ref 1.1–2.2)
ALBUMIN SERPL-MCNC: 4.6 G/DL (ref 3.4–5)
ALP BLD-CCNC: 83 U/L (ref 40–129)
ALT SERPL-CCNC: 19 U/L (ref 10–40)
ANION GAP SERPL CALCULATED.3IONS-SCNC: 14 MMOL/L (ref 3–16)
AST SERPL-CCNC: 24 U/L (ref 15–37)
BASOPHILS ABSOLUTE: 0 K/UL (ref 0–0.2)
BASOPHILS RELATIVE PERCENT: 0 %
BILIRUB SERPL-MCNC: <0.2 MG/DL (ref 0–1)
BUN BLDV-MCNC: 15 MG/DL (ref 7–20)
CALCIUM SERPL-MCNC: 9.8 MG/DL (ref 8.3–10.6)
CHLORIDE BLD-SCNC: 101 MMOL/L (ref 99–110)
CO2: 18 MMOL/L (ref 21–32)
CREAT SERPL-MCNC: 0.7 MG/DL (ref 0.6–1.1)
EKG ATRIAL RATE: 78 BPM
EKG DIAGNOSIS: NORMAL
EKG P AXIS: 62 DEGREES
EKG P-R INTERVAL: 136 MS
EKG Q-T INTERVAL: 372 MS
EKG QRS DURATION: 72 MS
EKG QTC CALCULATION (BAZETT): 424 MS
EKG R AXIS: 74 DEGREES
EKG T AXIS: 48 DEGREES
EKG VENTRICULAR RATE: 78 BPM
EOSINOPHILS ABSOLUTE: 0 K/UL (ref 0–0.6)
EOSINOPHILS RELATIVE PERCENT: 0 %
GFR AFRICAN AMERICAN: >60
GFR NON-AFRICAN AMERICAN: >60
GLUCOSE BLD-MCNC: 105 MG/DL (ref 70–99)
HCT VFR BLD CALC: 42.9 % (ref 36–48)
HEMOGLOBIN: 14.3 G/DL (ref 12–16)
LYMPHOCYTES ABSOLUTE: 0.5 K/UL (ref 1–5.1)
LYMPHOCYTES RELATIVE PERCENT: 7 %
MCH RBC QN AUTO: 30.8 PG (ref 26–34)
MCHC RBC AUTO-ENTMCNC: 33.4 G/DL (ref 31–36)
MCV RBC AUTO: 92.3 FL (ref 80–100)
MONOCYTES ABSOLUTE: 0.4 K/UL (ref 0–1.3)
MONOCYTES RELATIVE PERCENT: 5 %
NEUTROPHILS ABSOLUTE: 6.2 K/UL (ref 1.7–7.7)
NEUTROPHILS RELATIVE PERCENT: 88 %
PDW BLD-RTO: 12.7 % (ref 12.4–15.4)
PLATELET # BLD: 315 K/UL (ref 135–450)
PMV BLD AUTO: 7.7 FL (ref 5–10.5)
POTASSIUM REFLEX MAGNESIUM: 4.2 MMOL/L (ref 3.5–5.1)
PRO-BNP: 47 PG/ML (ref 0–124)
RBC # BLD: 4.65 M/UL (ref 4–5.2)
RBC # BLD: NORMAL 10*6/UL
SODIUM BLD-SCNC: 133 MMOL/L (ref 136–145)
TOTAL PROTEIN: 7.1 G/DL (ref 6.4–8.2)
TROPONIN: <0.01 NG/ML
TROPONIN: <0.01 NG/ML
WBC # BLD: 7 K/UL (ref 4–11)

## 2022-03-03 PROCEDURE — 93010 ELECTROCARDIOGRAM REPORT: CPT | Performed by: INTERNAL MEDICINE

## 2022-03-03 PROCEDURE — 36415 COLL VENOUS BLD VENIPUNCTURE: CPT

## 2022-03-03 PROCEDURE — 80053 COMPREHEN METABOLIC PANEL: CPT

## 2022-03-03 PROCEDURE — 6370000000 HC RX 637 (ALT 250 FOR IP): Performed by: PHYSICIAN ASSISTANT

## 2022-03-03 PROCEDURE — 85025 COMPLETE CBC W/AUTO DIFF WBC: CPT

## 2022-03-03 PROCEDURE — 84484 ASSAY OF TROPONIN QUANT: CPT

## 2022-03-03 PROCEDURE — 71045 X-RAY EXAM CHEST 1 VIEW: CPT

## 2022-03-03 PROCEDURE — 83880 ASSAY OF NATRIURETIC PEPTIDE: CPT

## 2022-03-03 PROCEDURE — 99284 EMERGENCY DEPT VISIT MOD MDM: CPT

## 2022-03-03 PROCEDURE — 93005 ELECTROCARDIOGRAM TRACING: CPT | Performed by: EMERGENCY MEDICINE

## 2022-03-03 RX ORDER — LORAZEPAM 0.5 MG/1
0.5 TABLET ORAL EVERY 6 HOURS PRN
COMMUNITY
End: 2022-03-04

## 2022-03-03 RX ORDER — ASPIRIN 81 MG/1
324 TABLET, CHEWABLE ORAL ONCE
Status: COMPLETED | OUTPATIENT
Start: 2022-03-03 | End: 2022-03-03

## 2022-03-03 RX ADMIN — ASPIRIN 81 MG 324 MG: 81 TABLET ORAL at 10:08

## 2022-03-03 ASSESSMENT — PAIN SCALES - GENERAL: PAINLEVEL_OUTOF10: 2

## 2022-03-03 ASSESSMENT — PAIN DESCRIPTION - DESCRIPTORS: DESCRIPTORS: OTHER (COMMENT)

## 2022-03-03 ASSESSMENT — ENCOUNTER SYMPTOMS
CONSTIPATION: 0
COUGH: 0
SHORTNESS OF BREATH: 0
COLOR CHANGE: 0
BACK PAIN: 0
DIARRHEA: 0
CHEST TIGHTNESS: 1
VOMITING: 0
ABDOMINAL PAIN: 0
NAUSEA: 0

## 2022-03-03 ASSESSMENT — HEART SCORE: ECG: 0

## 2022-03-03 NOTE — TELEPHONE ENCOUNTER
Received call from OSKAR BUCHANAN at [facility] with Red Flag Complaint. Subjective: Caller states \"2 weeks ago it started, I woke up in the middle of the night in panic attack. I do have ativan for when that happens, I did that that and it did settle down. Last week I was in [place]. I felt ill with a bad chest cold, it was not COVID. I have a really bad cough. When I was in [place], I could feel the blood pumping through the chambers in my heart and a warm feeling going up my neck. This is when I get the panic attack because I feel like I am having something like a heart attack. This morning I got the same feeling again. I have talked to my OBGYN and my anxiety doctor. \"     Current Symptoms: see above, patient has spoke to PCP and OBGYN about this before but she wants to be seen again since she it still having these episodes.        Reason for Disposition   Caller has already spoken with the PCP (or office), and has no further questions    Protocols used: NO CONTACT OR DUPLICATE CONTACT CALL-ADULT-[place]

## 2022-03-03 NOTE — ED PROVIDER NOTES
This is an independent YVONNE patient encounter. I am available for consultation if needed. I did not perform a face-to-face evaluation of this patient and was not asked to see the patient. I was not made aware of any details of the patient's H&P or medical decision making but was asked to review and document this EKG. See my interpretation of the EKG below. I shared my findings and interpretation with the YVONNE for use in his/her independent management of this patient. See his/her note for details of the patient's history, physical, and all medical decision making.       The 12 lead EKG was interpreted by me as follows:  Rate: normal with a rate of 78  Rhythm: sinus  Axis: normal  Intervals: normal IN, narrow QRS, normal QTc  ST segments: no ST elevations or depressions  T waves: no abnormal inversions  Non-specific T wave changes: not present  Prior EKG comparison: EKG dated 9/22/17 is not significantly different          Desiree Weiss MD  03/03/22 1048

## 2022-03-03 NOTE — ED PROVIDER NOTES
**EVALUATED BY YVONNE**    [address] [facility]  eMERGENCY dEPARTMENT eNCOUnter    Pt Name: Chasity Villareal  MRN: [id number]  [place] 1970  Date of evaluation: 3/3/2022  Provider: BALA Hernandez    Chief Complaint:    Chief Complaint   Patient presents with    Chest Pain     started this am.  has anxiety attacks and has had 4 in two weeks  no N/V/D  + sob  chest cold since last wednes        Nursing Notes, Past Medical Hx, Past Surgical Hx, Social Hx, Allergies, and Family Hx were all reviewed and agreedwith or any disagreements were addressed in the HPI.    HPI:  (Location, Duration, Timing, Severity, Quality, Assoc Sx, Context, Modifying factors)  This is a  46 y.o. female who presents to the emergency department with complaints of episode starting at 815 and where she feels a warmness/pressure to her chest that radiates up into her neck and arms. She states that she has had these symptoms for other times in the past 2 weeks. She states that she does have a history of anxiety and believes that she is going through menopause. She normally takes an Ativan and this does help relieve her symptoms but today symptoms are not relieved with a dose of Ativan. She does not have any cardiac history and no significant first-degree relative family history of heart disease. No prior history of MI. She has never had any cardiac testing. She states that she has borderline hyperlipidemia but not on medication. No history of diabetes, non-smoker and no history of hypertension. She is also having associated lightheadedness with this, she states that it is like a hot sensation that overwhelms her chest and into her head and makes her feel lightheaded. She describes her discomfort as hotness, 2 out of 10 in severity without known aggravating or alleviating factors. She denies any associated diaphoresis. She may feel a little bit nauseated. All other systems were reviewed and are negative. Past Medical/Surgical History:      Diagnosis Date    Allergic rhinitis     Panic attacks          Procedure Laterality Date    ABDOMINAL EXPLORATION SURGERY  3/12/13    BREAST SURGERY Right     for blocked duct    COLON SURGERY      COLONOSCOPY  11/2013    repeat in 5-7 years, [place]    HIP SURGERY Left     labral tear, femur acetabullar impigement    HYSTERECTOMY, TOTAL ABDOMINAL      ovaries still there, done for fibroid and pre-cancerous cervical cells       Medications:  Previous Medications    AMITRIPTYLINE (ELAVIL) 10 MG TABLET    TAKE ONE TO TWO TABLETS BY MOUTH ONCE NIGHTLY AS NEEDED FOR SLEEP    CYANOCOBALAMIN (VITAMIN B 12) 100 MCG LOZG    Take 1 tablet by mouth as needed    DIPHENHYDRAMINE (BENADRYL) 25 MG TABLET    Take 25 mg by mouth as needed for Itching    LORAZEPAM (ATIVAN) 0.5 MG TABLET    Take 0.5 mg by mouth every 6 hours as needed for Anxiety. Review of Systems:  Review of Systems   Constitutional: Negative for chills, fatigue and fever. Respiratory: Positive for chest tightness. Negative for cough and shortness of breath. Cardiovascular: Negative for chest pain. Gastrointestinal: Negative for abdominal pain, constipation, diarrhea, nausea and vomiting. Musculoskeletal: Negative for back pain. Skin: Negative for color change and rash. Neurological: Positive for light-headedness. Negative for dizziness, speech difficulty, weakness, numbness and headaches. All other systems reviewed and are negative. Positives and Pertinent negatives as per HPI. Except as noted above in the ROS, all other systems were reviewed/completed and are negative. Physical Exam:  Physical Exam  Vitals and nursing note reviewed. Constitutional:       Appearance: Normal appearance. She is well-developed. She is not toxic-appearing or diaphoretic. HENT:      Head: Normocephalic.       Right Ear: External ear normal.      Left Ear: External ear normal.      Nose: Nose normal. Eyes:      General:         Right eye: No discharge. Left eye: No discharge. Conjunctiva/sclera: Conjunctivae normal.   Cardiovascular:      Rate and Rhythm: Normal rate and regular rhythm. Pulses:           Radial pulses are 2+ on the right side and 2+ on the left side. Heart sounds: Normal heart sounds. No murmur heard. No friction rub. No gallop. Pulmonary:      Effort: Pulmonary effort is normal. No respiratory distress. Breath sounds: Normal breath sounds. No wheezing or rales. Musculoskeletal:         General: Normal range of motion. Cervical back: Normal range of motion and neck supple. Skin:     General: Skin is warm and dry. Coloration: Skin is not pale. Neurological:      Mental Status: She is alert and oriented to person, place, and time. Psychiatric:         Behavior: Behavior normal. Behavior is cooperative.      MEDICAL DECISION MAKING    Vitals:    Vitals:    03/03/22 1310 03/03/22 1340 03/03/22 1355 03/03/22 1410   BP: 109/85 123/73 114/68 109/68   Pulse: 70  77 80   Resp: 16  14 16   Temp:       TempSrc:       SpO2: 96%  97% 98%   Weight:       Height:           LABS:   Results for orders placed or performed during the hospital encounter of 03/03/22   CBC with Auto Differential   Result Value Ref Range    WBC 7.0 4.0 - 11.0 K/uL    RBC 4.65 4.00 - 5.20 M/uL    Hemoglobin 14.3 12.0 - 16.0 g/dL    Hematocrit 42.9 36.0 - 48.0 %    MCV 92.3 80.0 - 100.0 fL    MCH 30.8 26.0 - 34.0 pg    MCHC 33.4 31.0 - 36.0 g/dL    RDW 12.7 12.4 - 15.4 %    Platelets 216 014 - 427 K/uL    MPV 7.7 5.0 - 10.5 fL    Neutrophils % 88.0 %    Lymphocytes % 7.0 %    Monocytes % 5.0 %    Eosinophils % 0.0 %    Basophils % 0.0 %    Neutrophils Absolute 6.2 1.7 - 7.7 K/uL    Lymphocytes Absolute 0.5 (L) 1.0 - 5.1 K/uL    Monocytes Absolute 0.4 0.0 - 1.3 K/uL    Eosinophils Absolute 0.0 0.0 - 0.6 K/uL    Basophils Absolute 0.0 0.0 - 0.2 K/uL    RBC Morphology Normal    Troponin Result Value Ref Range    Troponin <0.01 <0.01 ng/mL   Brain Natriuretic Peptide   Result Value Ref Range    Pro-BNP 47 0 - 124 pg/mL   Comprehensive Metabolic Panel w/ Reflex to MG   Result Value Ref Range    Sodium 133 (L) 136 - 145 mmol/L    Potassium reflex Magnesium 4.2 3.5 - 5.1 mmol/L    Chloride 101 99 - 110 mmol/L    CO2 18 (L) 21 - 32 mmol/L    Anion Gap 14 3 - 16    Glucose 105 (H) 70 - 99 mg/dL    BUN 15 7 - 20 mg/dL    CREATININE 0.7 0.6 - 1.1 mg/dL    GFR Non-African American >60 >60    GFR African American >60 >60    Calcium 9.8 8.3 - 10.6 mg/dL    Total Protein 7.1 6.4 - 8.2 g/dL    Albumin 4.6 3.4 - 5.0 g/dL    Albumin/Globulin Ratio 1.8 1.1 - 2.2    Total Bilirubin <0.2 0.0 - 1.0 mg/dL    Alkaline Phosphatase 83 40 - 129 U/L    ALT 19 10 - 40 U/L    AST 24 15 - 37 U/L   Troponin   Result Value Ref Range    Troponin <0.01 <0.01 ng/mL   EKG 12 Lead   Result Value Ref Range    Ventricular Rate 78 BPM    Atrial Rate 78 BPM    P-R Interval 136 ms    QRS Duration 72 ms    Q-T Interval 372 ms    QTc Calculation (Bazett) 424 ms    P Axis 62 degrees    R Axis 74 degrees    T Axis 48 degrees    Diagnosis Normal sinus rhythm      Remainder of labs reviewed and were negative at this time or not returned at the time of this note. RADIOLOGY:   Non-plain film images suchas CT, Ultrasound and MRI are read by the radiologist. Elaine NAZARIO PA have directly visualized the radiologic plain film image(s) with the below findings:  Interpretation per the Radiologist below, if available at the time of this note:    XR CHEST PORTABLE   Preliminary Result   No acute cardiopulmonary disease. MEDICAL DECISION MAKING / ED COURSE:      PROCEDURES:   Procedures    Patient was given:  Medications   aspirin chewable tablet 324 mg (324 mg Oral Given 3/3/22 1008)     Patient presents to the emergency department with hot sensation over her chest and into her face. Given 324 mg aspirin.   She does have a low heart score has no risk factors for heart disease. Has not had any outpatient testing for cardiac etiology for her symptoms today. She has had 4 episodes of these symptoms. Spoke with her gynecologist and was told that it could be related to hormone changes. She is otherwise well-appearing. She got a normal white count, lymphocytes absolute lymphocytes of 0.5. Initial troponin is normal and chest x-ray shows no acute cardiopulmonary disease. EKG read by ER physician without signs of acute ischemia. Repeat troponin is also negative. She has a low heart score of 1. We will discharge her with outpatient follow-up referred to cardiology. The patient presents with chest pain. The patient has been evaluated and the history and physical exam suggest a benign etiology. I see nothing to suggest coronary ischemia, myocardial infarction, pulmonary embolism, thoracic aortic dissection, significant pericarditis, pneumonia, pneumothorax, or acute abdomen. I feel the patient can be safely discharged to home with outpatient follow up. Instructions have been given for the patient to return to the ED for any worsening of the symptoms, including but not limited to increased pain, shortness of breath, abdominal pain or weakness. The patient tolerated their visit well. I have evaluated the patient with physician available for consultation as needed. I have discussed the findings of today's workup with the patient and addressed the patient's questions and concerns. Important warning signs as well as new or worsening symptoms which wouldnecessitate immediate return to the ED were discussed. The plan is to discharge from the ED at this time, and the patient is in stable condition. The patient acknowledged understanding is agreeable with this plan. CLINICAL IMPRESSION:  1.  Chest pain, unspecified type        DISPOSITION        PATIENT REFERRED TO:  Milton Abel, [address] [address]  [phone]    Schedule an appointment as soon as possible for a visit       [facility]  [address]  [address]  Schedule an appointment as soon as possible for a visit         DISCHARGE MEDICATIONS:  New Prescriptions    No medications on file              (Please note the MDM and HPI sections of this note were completed with avoice recognition program.  Efforts were made to edit the dictations but occasionally words are mis-transcribed.)    Electronically signed, BALA Ramsey,            BALA Velez  03/03/22 8305

## 2022-03-04 ENCOUNTER — TELEPHONE (OUTPATIENT)
Dept: FAMILY MEDICINE CLINIC | Age: 52
End: 2022-03-04

## 2022-03-04 ENCOUNTER — OFFICE VISIT (OUTPATIENT)
Dept: FAMILY MEDICINE CLINIC | Age: 52
End: 2022-03-04
Payer: COMMERCIAL

## 2022-03-04 VITALS
BODY MASS INDEX: 23.53 KG/M2 | TEMPERATURE: 97.5 F | WEIGHT: 136 LBS | DIASTOLIC BLOOD PRESSURE: 76 MMHG | SYSTOLIC BLOOD PRESSURE: 110 MMHG | HEART RATE: 100 BPM

## 2022-03-04 DIAGNOSIS — R07.9 CHEST PAIN, UNSPECIFIED TYPE: Primary | ICD-10-CM

## 2022-03-04 DIAGNOSIS — F41.9 ANXIETY: Primary | ICD-10-CM

## 2022-03-04 DIAGNOSIS — R07.9 CHEST PAIN, UNSPECIFIED TYPE: ICD-10-CM

## 2022-03-04 PROCEDURE — G8427 DOCREV CUR MEDS BY ELIG CLIN: HCPCS | Performed by: PHYSICIAN ASSISTANT

## 2022-03-04 PROCEDURE — 99214 OFFICE O/P EST MOD 30 MIN: CPT | Performed by: PHYSICIAN ASSISTANT

## 2022-03-04 PROCEDURE — 1036F TOBACCO NON-USER: CPT | Performed by: PHYSICIAN ASSISTANT

## 2022-03-04 PROCEDURE — 3017F COLORECTAL CA SCREEN DOC REV: CPT | Performed by: PHYSICIAN ASSISTANT

## 2022-03-04 PROCEDURE — G8420 CALC BMI NORM PARAMETERS: HCPCS | Performed by: PHYSICIAN ASSISTANT

## 2022-03-04 PROCEDURE — G8484 FLU IMMUNIZE NO ADMIN: HCPCS | Performed by: PHYSICIAN ASSISTANT

## 2022-03-04 RX ORDER — LORAZEPAM 0.5 MG/1
0.5 TABLET ORAL EVERY 6 HOURS PRN
Status: CANCELLED | OUTPATIENT
Start: 2022-03-04

## 2022-03-04 RX ORDER — LORAZEPAM 0.5 MG/1
0.5 TABLET ORAL DAILY PRN
Qty: 20 TABLET | Refills: 0 | Status: SHIPPED | OUTPATIENT
Start: 2022-03-04 | End: 2022-09-14 | Stop reason: SDUPTHER

## 2022-03-04 ASSESSMENT — ENCOUNTER SYMPTOMS
EYE PAIN: 0
SHORTNESS OF BREATH: 0
CHEST TIGHTNESS: 0
TROUBLE SWALLOWING: 0
DIARRHEA: 0
BACK PAIN: 0
VOICE CHANGE: 0
ABDOMINAL PAIN: 0
CONSTIPATION: 0
COUGH: 0
SORE THROAT: 0

## 2022-03-04 NOTE — TELEPHONE ENCOUNTER
Shay Neal from [address] [place] [address] Drive is [phone] calling to request Dr Cruz Payment to send another order for the PT. The order needs to say [facility] instead of [facility]. Newton Sheikh

## 2022-03-04 NOTE — PATIENT INSTRUCTIONS
Ruby Nick was seen today for ed follow-up. Diagnoses and all orders for this visit:    Anxiety  -     LORazepam (ATIVAN) 0.5 MG tablet; Take 1 tablet by mouth daily as needed for Anxiety for up to 30 days. Chest pain, unspecified type  -     Stress test, myoview; Future       Get stress test, refilled ativan. Will decide on cardiology when we get stress test back.
Statement Selected

## 2022-03-04 NOTE — PROGRESS NOTES
Subjective:      Patient ID: Lis Kimball is a 46 y.o. female. HPI   Patient presenting following ED encounter on 3/3/21. Patient reports she went to the ED yesterday due to some chest pain she was having. This was her fourth and most recent episode of these symptoms. Yesterday, at her desk, she couldn't focus on computer screen. Tried to calm herself down, tried to walk over to the printer and felt the burning in her chest. She told her boss she felt unwell- they sat together. Reports when its anxiety it usually goes away. Described it as she has to vomit or have diarrhea. She reports Ativan usually helps relieve her chest pain but it was not helping yesterday. CXR in ED revealed no acute cardiopulmonary disease. Initial and repeat troponin negative and EKG without signs of acute ischemia. Reports she has had chest pain 4 times in the past 2 weeks. Reports the first episode was 2 weeks ago 2/12-14. Patient reports there is a murder suicide history in the family- her grandfather shot her grandmother then shot himself- in dream she was standing over them. She she woke up she had panic attack. Took Ativan and it worked. She believes this is anxiety or hormone related. Spoke to [facility] and they considered estrogen issues. Spoke to Dr. Christian Moe about stress test and Holter monitor- she has never seen cardiology. Second episode- flew to [facility] and visited with parents. When she left on Monday dad went to get cold medicine. Wednesday getting cold symptoms. Thursday felt like flu. Father tested for COVID and negative. But described another episode of this burning in the chest, goes up to the left side of her next, and into her head on 2/23 Thursday night, 2/25 - unprovoked, starts as burning chest, goes up left side of her neck and into her head. Sat up and tried breathing. Took Ativan. Patient reports she has dry cough and nasal congestion- but now feels much better from cold overall.      She had not felt well since having COVID in November. Reports she is fatigued all the time but is not depressed. Thanksgiving morning she moved son to [place]- she was eating right, feeling well, lifting weights. Next morning felt like she had a cold then tested positive for COVID. She report she can't get strength back. She fells very fatigued and can't walk. When she gets home she doesn't even want to try to workout. Believes she may have had COVID in October 2019- she had something like flu but never got tested. She was sick for 3 weeks. Patient currently taking 10 mg Amitriptyline and 25 mg Benadryl at night and 0.5 mg Ativan PRN for anxiety- only takes it a a few times a month otherwise. Recently has been taking the Ativan more. Reports her baseline is relatively low anxiety. Had most of her anxiety when kids were in college- fearful they are going to drink or get in trouble. Reports taking care of many family members at home- some stress with that but manageable. Review of Systems   Constitutional: Negative for appetite change, fatigue and unexpected weight change. HENT: Negative for congestion, dental problem, ear pain, hearing loss, sore throat, trouble swallowing and voice change. Eyes: Negative for pain and visual disturbance. Respiratory: Negative for cough, chest tightness and shortness of breath. Cardiovascular: Positive for chest pain. Negative for palpitations. Gastrointestinal: Negative for abdominal pain, constipation and diarrhea. Genitourinary: Negative for difficulty urinating. Musculoskeletal: Negative for arthralgias, back pain, myalgias and neck pain. Skin: Negative for rash. Neurological: Negative for dizziness, speech difficulty, weakness, numbness and headaches. Hematological: Negative for adenopathy. Psychiatric/Behavioral: Negative for confusion and sleep disturbance. The patient is nervous/anxious (chronic). Objective:   Physical Exam  Vitals reviewed. Constitutional:       General: She is not in acute distress. Appearance: Normal appearance. She is well-developed and well-groomed. HENT:      Head: Normocephalic and atraumatic. Mouth/Throat:      Mouth: Mucous membranes are moist.      Pharynx: Oropharynx is clear. Eyes:      Extraocular Movements: Extraocular movements intact. Conjunctiva/sclera: Conjunctivae normal.   Cardiovascular:      Rate and Rhythm: Normal rate and regular rhythm. Heart sounds: Normal heart sounds. No murmur heard. No friction rub. No gallop. Pulmonary:      Effort: Pulmonary effort is normal.      Breath sounds: Normal breath sounds. No stridor. No wheezing, rhonchi or rales. Musculoskeletal:      Cervical back: Normal range of motion. Right lower leg: No edema. Left lower leg: No edema. Skin:     General: Skin is warm and dry. Neurological:      General: No focal deficit present. Mental Status: She is alert and oriented to person, place, and time. Psychiatric:         Attention and Perception: Attention normal.         Mood and Affect: Mood normal.         Speech: Speech normal.         Behavior: Behavior normal. Behavior is cooperative. Assessment:      Salvador Fuentes was seen today for ed follow-up. Diagnoses and all orders for this visit:    Anxiety  -     LORazepam (ATIVAN) 0.5 MG tablet; Take 1 tablet by mouth daily as needed for Anxiety for up to 30 days. Chest pain, unspecified type  -     Stress test, myoview; Future           Plan:      Get stress test, refilled ativan. Will decide on cardiology when we get stress test back. Controlled substances monitoring: possible medication side effects, risk of tolerance and/or dependence, and alternative treatments discussed, no signs of potential drug abuse or diversion identified and OARRS report reviewed today- activity consistent with treatment plan.           Marco Maldonado, [address]

## 2022-03-09 ENCOUNTER — HOSPITAL ENCOUNTER (OUTPATIENT)
Dept: NON INVASIVE DIAGNOSTICS | Age: 52
Discharge: HOME | End: 2022-03-09
Payer: COMMERCIAL

## 2022-03-09 DIAGNOSIS — R07.9 CHEST PAIN, UNSPECIFIED TYPE: ICD-10-CM

## 2022-03-09 LAB
LV EF: 71 %
LVEF MODALITY: NORMAL

## 2022-03-09 PROCEDURE — 78452 HT MUSCLE IMAGE SPECT MULT: CPT

## 2022-03-09 PROCEDURE — 93017 CV STRESS TEST TRACING ONLY: CPT | Performed by: INTERNAL MEDICINE

## 2022-03-09 PROCEDURE — 3430000000 HC RX DIAGNOSTIC RADIOPHARMACEUTICAL: Performed by: FAMILY MEDICINE

## 2022-03-09 PROCEDURE — A9502 TC99M TETROFOSMIN: HCPCS | Performed by: FAMILY MEDICINE

## 2022-03-09 RX ADMIN — TETROFOSMIN 10 MILLICURIE: 1.38 INJECTION, POWDER, LYOPHILIZED, FOR SOLUTION INTRAVENOUS at 07:46

## 2022-03-09 RX ADMIN — TETROFOSMIN 30 MILLICURIE: 1.38 INJECTION, POWDER, LYOPHILIZED, FOR SOLUTION INTRAVENOUS at 09:41

## 2022-03-09 NOTE — PROGRESS NOTES
Patient instructed on [place] Protocol Stress Test Procedure including possible side effects and adverse reactions. Verbalizes knowledge and understanding and denies having any questions.

## 2022-03-23 ENCOUNTER — HOSPITAL ENCOUNTER (OUTPATIENT)
Dept: WOMENS IMAGING | Age: 52
Discharge: HOME | End: 2022-03-23
Payer: COMMERCIAL

## 2022-03-23 VITALS — WEIGHT: 136 LBS | HEIGHT: 64 IN | BODY MASS INDEX: 23.22 KG/M2

## 2022-03-23 DIAGNOSIS — Z12.31 BREAST CANCER SCREENING BY MAMMOGRAM: ICD-10-CM

## 2022-03-23 PROCEDURE — 77063 BREAST TOMOSYNTHESIS BI: CPT

## 2022-09-14 DIAGNOSIS — F41.9 ANXIETY: ICD-10-CM

## 2022-09-14 RX ORDER — LORAZEPAM 0.5 MG/1
0.5 TABLET ORAL DAILY PRN
Qty: 20 TABLET | Refills: 0 | Status: SHIPPED | OUTPATIENT
Start: 2022-09-14 | End: 2022-10-14

## 2022-09-14 RX ORDER — AMITRIPTYLINE HYDROCHLORIDE 10 MG/1
TABLET, FILM COATED ORAL
Qty: 60 TABLET | Refills: 12 | Status: SHIPPED | OUTPATIENT
Start: 2022-09-14

## 2022-09-14 NOTE — TELEPHONE ENCOUNTER
Medication:   Requested Prescriptions     Pending Prescriptions Disp Refills    amitriptyline (ELAVIL) 10 MG tablet 60 tablet 12     Sig: TAKE ONE TO TWO TABLETS BY MOUTH ONCE NIGHTLY AS NEEDED FOR SLEEP    LORazepam (ATIVAN) 0.5 MG tablet 20 tablet 0     Sig: Take 1 tablet by mouth daily as needed for Anxiety for up to 30 days. Last Filled:  Ativan, 3/4/22    Patient Phone Number: [phone] (home) [phone] (work)    Last appt: 3/4/2022   Next appt: 11/3/2022    Last OARRS:   RX Monitoring 3/4/2022   Periodic Controlled Substance Monitoring Possible medication side effects, risk of tolerance/dependence & alternative treatments discussed. ;No signs of potential drug abuse or diversion identified.      PDMP Monitoring:    Last PDMP David Garcia as Reviewed [facility]):  Review User Review Instant Review Result   Stormy KENDALL 11/30/2021 10:24 PM Reviewed PDMP [1]     Preferred Pharmacy:   [facility] [id number] [facility], [facility] [phone] Bia Chaudhry [phone]  [address] [place] [address] Road  [address] [place] [address]  Phone: [phone] Fax: [phone]

## 2022-10-20 ENCOUNTER — TELEPHONE (OUTPATIENT)
Dept: FAMILY MEDICINE CLINIC | Age: 52
End: 2022-10-20

## 2022-10-20 DIAGNOSIS — Z00.00 PREVENTATIVE HEALTH CARE: ICD-10-CM

## 2022-10-20 DIAGNOSIS — E78.00 PURE HYPERCHOLESTEROLEMIA: Primary | ICD-10-CM

## 2022-10-20 DIAGNOSIS — Z13.1 SCREENING FOR DIABETES MELLITUS: ICD-10-CM

## 2022-10-20 NOTE — TELEPHONE ENCOUNTER
Called and spoke with patient and advised RK out of office this week. Will hold for her return and we will call once the orders are placed. Patient will have completed at [facility] as well.

## 2022-10-20 NOTE — TELEPHONE ENCOUNTER
Patient wants to know what blood work is needed for her appointment scheduled on 11/3/22. Please advise call patient.

## 2022-10-25 ENCOUNTER — HOSPITAL ENCOUNTER (OUTPATIENT)
Age: 52
Discharge: HOME | End: 2022-10-25
Payer: COMMERCIAL

## 2022-10-25 DIAGNOSIS — E78.00 PURE HYPERCHOLESTEROLEMIA: ICD-10-CM

## 2022-10-25 LAB
A/G RATIO: 1.9 (ref 1.1–2.2)
ALBUMIN SERPL-MCNC: 4.3 G/DL (ref 3.4–5)
ALP BLD-CCNC: 59 U/L (ref 40–129)
ALT SERPL-CCNC: 12 U/L (ref 10–40)
ANION GAP SERPL CALCULATED.3IONS-SCNC: 12 MMOL/L (ref 3–16)
AST SERPL-CCNC: 18 U/L (ref 15–37)
BILIRUB SERPL-MCNC: <0.2 MG/DL (ref 0–1)
BUN BLDV-MCNC: 12 MG/DL (ref 7–20)
CALCIUM SERPL-MCNC: 9.3 MG/DL (ref 8.3–10.6)
CHLORIDE BLD-SCNC: 106 MMOL/L (ref 99–110)
CHOLESTEROL, TOTAL: 205 MG/DL (ref 0–199)
CO2: 24 MMOL/L (ref 21–32)
CREAT SERPL-MCNC: 0.8 MG/DL (ref 0.6–1.1)
GFR SERPL CREATININE-BSD FRML MDRD: >60 ML/MIN/{1.73_M2}
GLUCOSE BLD-MCNC: 97 MG/DL (ref 70–99)
HDLC SERPL-MCNC: 51 MG/DL (ref 40–60)
LDL CHOLESTEROL CALCULATED: 137 MG/DL
POTASSIUM SERPL-SCNC: 4 MMOL/L (ref 3.5–5.1)
SODIUM BLD-SCNC: 142 MMOL/L (ref 136–145)
TOTAL PROTEIN: 6.6 G/DL (ref 6.4–8.2)
TRIGL SERPL-MCNC: 86 MG/DL (ref 0–150)
VLDLC SERPL CALC-MCNC: 17 MG/DL

## 2022-10-25 PROCEDURE — 80053 COMPREHEN METABOLIC PANEL: CPT

## 2022-10-25 PROCEDURE — 36415 COLL VENOUS BLD VENIPUNCTURE: CPT

## 2022-10-25 PROCEDURE — 80061 LIPID PANEL: CPT

## 2022-11-01 NOTE — PROGRESS NOTES
Here for annual physical.    Dental: up-to-date  Eye: up-to-date      Colonoscopy: up-to-date      Pap: had hysterectomy  Mammo: up-to-date      Exercise: walking hr 3-4 times a week, 25 min daily with cardio strength. Diet: 3 meals daily with healthy diet in the past month      Living Will: Yes,   Copy requested      PHQ Scores 11/3/2022 9/24/2021 5/13/2021 7/21/2020 9/12/2019 8/15/2018   PHQ2 Score 0 0 0 0 0 0   PHQ9 Score 0 0 0 0 0 0     Uses lorazepam 1-2 times a month for anxiety. States was having a lot of anxiety over summer but has calmed down recently. Would like refill on meloxicam, uses prn for joint pain. When saw gyn a month ago was talking about her anxiety, starts in her chest, sometimes up her neck. Also will get a warm feeling in her chest at times that spreads and will feel like needs to be doused in cold water. Gyn gave her estrogen to try. Uses meloxicam for her joint pains, works well no SE      HM reviewed with pt    Patient's medications, allergies, past medical, surgical, social and family histories were reviewed and updated in the EHR as appropriate. Vitals:    11/03/22 0734   BP: (!) 90/58   Site: Left Upper Arm   Position: Sitting   Cuff Size: Medium Adult   Pulse: 89   Temp: 98 °F (36.7 °C)   TempSrc: Infrared   SpO2: 98%   Weight: 135 lb 3.2 oz (61.3 kg)   Height: 5' 4.5\" (1.638 m)     Wt Readings from Last 3 Encounters:   11/03/22 135 lb 3.2 oz (61.3 kg)   03/23/22 136 lb (61.7 kg)   03/04/22 136 lb (61.7 kg)     Body mass index is 22.85 kg/m². GENERAL Alert and oriented x 4 NAD, affect appropriate and normal appearing weight, well hydrated, well developed.   NECK:supple and non tender without mass, no thyromegaly or thyroid nodules, no cervical lymphadenopathy  HEENT: TM clear bilaterally  LUNG:clear to auscultation bilaterally with normal respiratory effort  CV: Normal heart sounds, regular rate and rhythm without murmurs  EXTREMETY: no loss of hair, no edema, normal pedal pulses bilaterally  NEURO: CN grossly intact, moving all extremities equally, no gross deficits          ASSESSMENT AND PLAN:       Cecily Smith was seen today for annual exam.    Diagnoses and all orders for this visit:    Well adult exam  Recommended screenings discussed and ordered if patient agreed  Recommended vaccinations discussed and ordered if patient agreed  Encouraged healthy diet   Encouraged regular exercise and maintaining a healthy weight  Discussed living will/healthcare POA and encouraged to get if doesn't have. Anxiety  Hot flashes  Sound more like hot flashes, likely triggering her anxiety. Sleep not affected as takes amitriptyline  Discussed pros and cons of HRT. Discussed likely would be most effective treatment for her hot flashes. Discussed non hormonal tx as well. Pt will try OTC supplements. Discussed can try SSRI, gabapentin  or clonidine as well if desires non hormonal tx      Needs flu shot  -     Influenza, FLUCELVAX, (age 10 mo+), IM, Preservative Free, 0.5 mL    Other orders  -     meloxicam (MOBIC) 15 MG tablet; Take 1 tablet by mouth daily as needed for Pain          Return in about 1 year (around 11/3/2023) for Well, 30 min.          Portions of Note per  Lisa Smith CMA AAMA with corrections and edits per Gloria Carlson MD.  I agree with entirety of note and was present and performed history and physical.  I also confirm that the note above accurately reflects all work, treatment, procedures, and medical decision making performed by me, Gloria Carlson MD

## 2022-11-03 ENCOUNTER — OFFICE VISIT (OUTPATIENT)
Dept: FAMILY MEDICINE CLINIC | Age: 52
End: 2022-11-03
Payer: COMMERCIAL

## 2022-11-03 VITALS
TEMPERATURE: 98 F | SYSTOLIC BLOOD PRESSURE: 90 MMHG | OXYGEN SATURATION: 98 % | BODY MASS INDEX: 22.53 KG/M2 | HEIGHT: 65 IN | DIASTOLIC BLOOD PRESSURE: 58 MMHG | WEIGHT: 135.2 LBS | HEART RATE: 89 BPM

## 2022-11-03 DIAGNOSIS — Z00.00 WELL ADULT EXAM: Primary | ICD-10-CM

## 2022-11-03 DIAGNOSIS — R23.2 HOT FLASHES: ICD-10-CM

## 2022-11-03 DIAGNOSIS — Z23 NEEDS FLU SHOT: ICD-10-CM

## 2022-11-03 DIAGNOSIS — F41.9 ANXIETY: ICD-10-CM

## 2022-11-03 PROCEDURE — 99396 PREV VISIT EST AGE 40-64: CPT | Performed by: FAMILY MEDICINE

## 2022-11-03 PROCEDURE — G8420 CALC BMI NORM PARAMETERS: HCPCS | Performed by: FAMILY MEDICINE

## 2022-11-03 PROCEDURE — G8427 DOCREV CUR MEDS BY ELIG CLIN: HCPCS | Performed by: FAMILY MEDICINE

## 2022-11-03 PROCEDURE — 1036F TOBACCO NON-USER: CPT | Performed by: FAMILY MEDICINE

## 2022-11-03 PROCEDURE — 90674 CCIIV4 VAC NO PRSV 0.5 ML IM: CPT | Performed by: FAMILY MEDICINE

## 2022-11-03 PROCEDURE — 3017F COLORECTAL CA SCREEN DOC REV: CPT | Performed by: FAMILY MEDICINE

## 2022-11-03 PROCEDURE — G8482 FLU IMMUNIZE ORDER/ADMIN: HCPCS | Performed by: FAMILY MEDICINE

## 2022-11-03 PROCEDURE — 90471 IMMUNIZATION ADMIN: CPT | Performed by: FAMILY MEDICINE

## 2022-11-03 PROCEDURE — 99213 OFFICE O/P EST LOW 20 MIN: CPT | Performed by: FAMILY MEDICINE

## 2022-11-03 RX ORDER — MELOXICAM 15 MG/1
15 TABLET ORAL DAILY PRN
Qty: 30 TABLET | Refills: 4 | Status: SHIPPED | OUTPATIENT
Start: 2022-11-03

## 2022-11-03 RX ORDER — ESTRADIOL 0.1 MG/D
FILM, EXTENDED RELEASE TRANSDERMAL
COMMUNITY
Start: 2022-10-14

## 2022-11-03 ASSESSMENT — PATIENT HEALTH QUESTIONNAIRE - PHQ9
SUM OF ALL RESPONSES TO PHQ QUESTIONS 1-9: 0
1. LITTLE INTEREST OR PLEASURE IN DOING THINGS: 0
SUM OF ALL RESPONSES TO PHQ QUESTIONS 1-9: 0
SUM OF ALL RESPONSES TO PHQ9 QUESTIONS 1 & 2: 0
SUM OF ALL RESPONSES TO PHQ QUESTIONS 1-9: 0
2. FEELING DOWN, DEPRESSED OR HOPELESS: 0
SUM OF ALL RESPONSES TO PHQ QUESTIONS 1-9: 0

## 2022-11-03 NOTE — PATIENT INSTRUCTIONS
--Schedule your COVID booster at your local pharmacy. After your initial 2 dose series you can receive any of the vaccines for your booster doses. You can get a dose 2 months after your last one. --Bring in copy of living will and healthcare power of  for your chart. --Try listening to non-rhythmic music (spa type music) for 15 minutes every day. A 2015 study showed that women who did this for 12 weeks had 35% fewer hot flashes and 44% fewer moderate-to-severe hot flashes.      Germaine Rose, [address] [place] [address] , Wilton Yunno

## 2022-11-03 NOTE — PROGRESS NOTES
Immunization(s) given during visit:     Immunizations Administered       Name Date Dose Route    Influenza, FLUCELVAX, (age 10 mo+), MDCK, PF, 0.5mL 11/3/2022 0.5 mL Intramuscular    Site: Deltoid- Left    Lot: [id number]    NDC: 57479-328-96             Patient instructed to remain in clinic for 20 minutes after injection and was advised to report any adverse reaction to me immediately.

## 2023-01-24 ENCOUNTER — TELEPHONE (OUTPATIENT)
Dept: FAMILY MEDICINE CLINIC | Age: 53
End: 2023-01-24

## 2023-01-24 NOTE — TELEPHONE ENCOUNTER
Pt called and states she tested positive for Covid yesterday, sx started Sunday. She is interested in Paxlovid, or any other medication provider might think is appropriate. She states she currently doesn't have much of a cough, and no fevers, but she is having severe body and joint pain.  If possible please send medications to:    [facility] [id number] Margi Castillo, [address] [phone] [facility] [phone]   [address] [place] [address], [address] [place] [address]   Phone:  [phone]  Fax:  [phone]      Please advise

## 2023-01-24 NOTE — TELEPHONE ENCOUNTER
She is low risk so paxlovid is not appropriate for her and wouldn't benefit her. Just sx tx with OTC cold and flu meds.     Can take tylenol and/or ibuprofen for body aches

## 2023-01-27 ENCOUNTER — OFFICE VISIT (OUTPATIENT)
Dept: FAMILY MEDICINE CLINIC | Age: 53
End: 2023-01-27
Payer: COMMERCIAL

## 2023-01-27 VITALS — TEMPERATURE: 99 F | HEART RATE: 91 BPM | OXYGEN SATURATION: 100 %

## 2023-01-27 DIAGNOSIS — J01.90 ACUTE BACTERIAL SINUSITIS: ICD-10-CM

## 2023-01-27 DIAGNOSIS — U07.1 COVID-19: Primary | ICD-10-CM

## 2023-01-27 DIAGNOSIS — B96.89 ACUTE BACTERIAL SINUSITIS: ICD-10-CM

## 2023-01-27 PROCEDURE — G8420 CALC BMI NORM PARAMETERS: HCPCS | Performed by: FAMILY MEDICINE

## 2023-01-27 PROCEDURE — 99213 OFFICE O/P EST LOW 20 MIN: CPT | Performed by: FAMILY MEDICINE

## 2023-01-27 PROCEDURE — G8482 FLU IMMUNIZE ORDER/ADMIN: HCPCS | Performed by: FAMILY MEDICINE

## 2023-01-27 PROCEDURE — 1036F TOBACCO NON-USER: CPT | Performed by: FAMILY MEDICINE

## 2023-01-27 PROCEDURE — 3017F COLORECTAL CA SCREEN DOC REV: CPT | Performed by: FAMILY MEDICINE

## 2023-01-27 PROCEDURE — G8428 CUR MEDS NOT DOCUMENT: HCPCS | Performed by: FAMILY MEDICINE

## 2023-01-27 RX ORDER — DOXYCYCLINE HYCLATE 100 MG
100 TABLET ORAL 2 TIMES DAILY
Qty: 14 TABLET | Refills: 0 | Status: SHIPPED | OUTPATIENT
Start: 2023-01-27 | End: 2023-02-03

## 2023-01-27 NOTE — PROGRESS NOTES
Chief Complaint   Patient presents with    Concern For COVID-19       Home Covid test : yes - Monday 1/23, sx started Sunday. Body aches have improved. Congestion - cant get it out and left side of face, teeth and ear. Getting some stuff out in AM, yellow to clear. No fever. Cough - mostly in AM and at night but not bad, cold meds helping. Feeling better overall otherwise. Supposed to fly on Tuesday. Vitals:    01/27/23 1457   Pulse: 91   Temp: 99 °F (37.2 °C)   SpO2: 100%     Wt Readings from Last 3 Encounters:   11/03/22 135 lb 3.2 oz (61.3 kg)   03/23/22 136 lb (61.7 kg)   03/04/22 136 lb (61.7 kg)     There is no height or weight on file to calculate BMI. Alert and oriented x 4 NAD, affect appropriate and normal appearing weight, well hydrated, well developed. Left TM nl, canal nl and pinna nl  Right TM nl, canal nl and pinna nl  No nodes neck  Nares red and congested, thick yellow  drainage left nostril  OP mild erythema, no exudate, no swelling  Lung clear with good air movement and effort  CV RRR no M      ASSESSMENT AND PLAN:       Alonso Mendoza was seen today for concern for covid-19. Diagnoses and all orders for this visit:    COVID-19    Acute bacterial sinusitis  -     doxycycline hyclate (VIBRA-TABS) 100 MG tablet;  Take 1 tablet by mouth 2 times daily for 7 days  Discussed may be dif/painful to fly given sinuses  Discussed using neti pot/saline rinses through weekend and continuing the flonase  Discussed using afrin before flight to open airways  Discussed risk of barotrauma

## 2023-03-14 ENCOUNTER — TELEPHONE (OUTPATIENT)
Dept: FAMILY MEDICINE CLINIC | Age: 53
End: 2023-03-14

## 2023-03-14 NOTE — LETTER
March 14, 2023       Lisa Luna YOB: 1970   [facility] [address] [id number]        To Whom It May Concern:    Please excuse Ana Parada from jury duty for medical reasons. If you have any questions or concerns, please don't hesitate to call.     Sincerely,        Sadie Rodriguez MD Consent 3/Introductory Paragraph: I gave the patient a chance to ask questions they had about the procedure.  Following this I explained the Mohs procedure and consent was obtained. The risks, benefits and alternatives to therapy were discussed in detail. Specifically, the risks of infection, scarring, bleeding, prolonged wound healing, incomplete removal, allergy to anesthesia, nerve injury and recurrence were addressed. Prior to the procedure, the treatment site was clearly identified and confirmed by the patient. All components of Universal Protocol/PAUSE Rule completed.

## 2023-03-31 ENCOUNTER — HOSPITAL ENCOUNTER (OUTPATIENT)
Dept: WOMENS IMAGING | Age: 53
Discharge: HOME | End: 2023-03-31
Payer: COMMERCIAL

## 2023-03-31 VITALS — WEIGHT: 132 LBS | BODY MASS INDEX: 22.31 KG/M2

## 2023-03-31 DIAGNOSIS — Z12.31 VISIT FOR SCREENING MAMMOGRAM: ICD-10-CM

## 2023-03-31 PROCEDURE — 77063 BREAST TOMOSYNTHESIS BI: CPT

## 2023-04-17 ENCOUNTER — OFFICE VISIT (OUTPATIENT)
Dept: FAMILY MEDICINE CLINIC | Age: 53
End: 2023-04-17
Payer: COMMERCIAL

## 2023-04-17 VITALS
WEIGHT: 136 LBS | HEART RATE: 93 BPM | TEMPERATURE: 98.1 F | SYSTOLIC BLOOD PRESSURE: 118 MMHG | BODY MASS INDEX: 22.98 KG/M2 | OXYGEN SATURATION: 98 % | DIASTOLIC BLOOD PRESSURE: 74 MMHG

## 2023-04-17 DIAGNOSIS — F41.9 ANXIETY: ICD-10-CM

## 2023-04-17 DIAGNOSIS — U09.9 CHRONIC POST-COVID-19 SYNDROME: Primary | ICD-10-CM

## 2023-04-17 DIAGNOSIS — R41.89 BRAIN FOG: ICD-10-CM

## 2023-04-17 PROCEDURE — G8420 CALC BMI NORM PARAMETERS: HCPCS | Performed by: FAMILY MEDICINE

## 2023-04-17 PROCEDURE — 1036F TOBACCO NON-USER: CPT | Performed by: FAMILY MEDICINE

## 2023-04-17 PROCEDURE — 99214 OFFICE O/P EST MOD 30 MIN: CPT | Performed by: FAMILY MEDICINE

## 2023-04-17 PROCEDURE — G8427 DOCREV CUR MEDS BY ELIG CLIN: HCPCS | Performed by: FAMILY MEDICINE

## 2023-04-17 PROCEDURE — 3017F COLORECTAL CA SCREEN DOC REV: CPT | Performed by: FAMILY MEDICINE

## 2023-04-17 RX ORDER — FLUTICASONE PROPIONATE 50 MCG
1 SPRAY, SUSPENSION (ML) NASAL DAILY
COMMUNITY

## 2023-04-17 SDOH — ECONOMIC STABILITY: HOUSING INSECURITY
IN THE LAST 12 MONTHS, WAS THERE A TIME WHEN YOU DID NOT HAVE A STEADY PLACE TO SLEEP OR SLEPT IN A SHELTER (INCLUDING NOW)?: NO

## 2023-04-17 SDOH — ECONOMIC STABILITY: FOOD INSECURITY: WITHIN THE PAST 12 MONTHS, YOU WORRIED THAT YOUR FOOD WOULD RUN OUT BEFORE YOU GOT MONEY TO BUY MORE.: NEVER TRUE

## 2023-04-17 SDOH — ECONOMIC STABILITY: FOOD INSECURITY: WITHIN THE PAST 12 MONTHS, THE FOOD YOU BOUGHT JUST DIDN'T LAST AND YOU DIDN'T HAVE MONEY TO GET MORE.: NEVER TRUE

## 2023-04-17 SDOH — ECONOMIC STABILITY: INCOME INSECURITY: HOW HARD IS IT FOR YOU TO PAY FOR THE VERY BASICS LIKE FOOD, HOUSING, MEDICAL CARE, AND HEATING?: NOT HARD AT ALL

## 2023-04-17 ASSESSMENT — PATIENT HEALTH QUESTIONNAIRE - PHQ9
SUM OF ALL RESPONSES TO PHQ9 QUESTIONS 1 & 2: 0
SUM OF ALL RESPONSES TO PHQ QUESTIONS 1-9: 0
SUM OF ALL RESPONSES TO PHQ QUESTIONS 1-9: 0
2. FEELING DOWN, DEPRESSED OR HOPELESS: 0
SUM OF ALL RESPONSES TO PHQ QUESTIONS 1-9: 0
SUM OF ALL RESPONSES TO PHQ QUESTIONS 1-9: 0
1. LITTLE INTEREST OR PLEASURE IN DOING THINGS: 0

## 2023-04-17 NOTE — PATIENT INSTRUCTIONS
--\"LONG\" Teresa Hlocomb has started a [place] [address] clinic to help patients with persistant symptoms. The following web page has information about managing prolonged COVID-19 symptoms. [email or web address]?amp;fdb=84510    What is COVID-19?     -- COVID-19 stands for \"coronavirus disease 2019. \" It is caused by a virus called SARS-CoV-2. The virus first appeared in late 2019 and quickly spread around the world. People with COVID-19 can have fever, cough, trouble breathing (when the virus infects the lungs), and other symptoms. Since COVID-19 is a fairly new disease, experts are still studying how people recover from it. They are also studying the possible long-term effects. This article has information about recovery after COVID-19, including the ongoing symptoms some people have. When will I get better after having COVID-19?     -- For most people who get COVID-19, symptoms get better within a few weeks. But some people, especially those who got sick enough to need to go to the hospital, continue to have symptoms for longer. These can be mild or more serious. Doctors are still learning about COVID-19. But they generally describe 2 stages of illness and recovery:  ? \"Acute COVID-19\" - This refers to symptoms lasting up to 4 weeks after a person is infected. Most people with mild COVID-19 do not have symptoms beyond this stage, but some do. ? \"Post-COVID conditions\" - This refers to symptoms that continue beyond 3 months after being infected. This is more common in people who were critically ill, meaning they needed to stay in the intensive care unit (\"ICU\"), be put on a ventilator (breathing machine), or have other types of breathing support. Different terms have been used when people have persistent symptoms, meaning symptoms that last longer than a few months. These include \"long-COVID,\" \"chronic COVID-19,\" and \"post-COVID syndrome. \" Latoya Davis

## 2023-04-17 NOTE — PROGRESS NOTES
appropriate and normal appearing weight, well hydrated, well developed. ASSESSMENT AND PLAN:       Luann Lino was seen today for fatigue. Diagnoses and all orders for this visit:    Chronic post-COVID-19 syndrome    Anxiety    Brain fog      Sx not present in Fall when discussed hot flashes  Sx started after covid in January and are consistent with long covid  Discussed current research on long covid  Discussed pacing for management  Resources given for pt to look at on management  Discussed most people will improve with time but may take months  Monitor      Reviewed pre-visit labs with patient. (Lipid, CMP, and TSH) Review of these labs contributed to MDM.          Portions of Note per  Castillo Garay CMA AAMA with corrections and edits per Varun Palmer MD.  I agree with entirety of note and was present and performed history and physical.  I also confirm that the note above accurately reflects all work, treatment, procedures, and medical decision making performed by me, Varun Palmer MD

## 2023-06-26 NOTE — FLOWSHEET NOTE
[address] [facility]  Phone: [phone]   Fax: [phone]    Physical Therapy Treatment Note/ Progress Report:     Date:  10/14/2020    Patient Name:  Elizbeth Pallas    :  1970  MRN: [id number]  Restrictions/Precautions:    Medical/Treatment Diagnosis Information:  Diagnosis: M77.11 (ICD-10-CM) - Lateral epicondylitis of right elbow  Treatment Diagnosis: muscle pain with over activation of extensors/flexors of R forearm, noted muscle tension in ECRB, ECU, and FCU   Insurance/Certification information:  PT Insurance Information: BCBS (Auth AIM Req - 20 visits [place] renews on 10/1/2020) No Copay 100% coverage after deductible met  Physician Information:  Referring Practitioner: Chris Sepulveda MD  Plan of care signed (Y/N): [x]  Yes []  No     Date of Patient follow up with Physician: 10/28/2020     Progress Report: []  Yes  [x]  No     Date Range for reporting period:  Beginnin2020  Ending:     Progress report due (10 Rx/or 30 days whichever is less): visit #10 or  (date)     Recertification due (POC duration/ or 90 days whichever is less): visit #16 or 2020 (8 weeks)     Visit # Insurance Allowable Auth required? Date Range   3 20 visits [facility]   Auth Required [x]  Yes  []  No Renews 10/1/2020     Latex Allergy:  [x]NO      []YES  Preferred Language for Healthcare:   [x]English       []other:    Functional Scale:       Date assessed:  Quick DASH: raw score = 27; dysfunction = 36%  2020    Pain level:  0/10     SUBJECTIVE:  Patient reports she is doing absolutely great. She did have a small flare up after push mowing the entire yard and a long day at the office without elbow support at her computer set up. Stated she wanted to come back for 1 more session to receive IASTM then thinks she will be able to completely take away her discomfort. States the pain is much less intense and she is able to decrease it by performing exercises.  Plans to have 2 weeks off with completing house chores including painting and room and will call in to schedule a f/u appt if her pain returns. OBJECTIVE: See eval   Observation:    Test measurements:      RESTRICTIONS/PRECAUTIONS: n/a    Exercises/Interventions:   Therapeutic Exercise (35518) Resistance / level Sets / Seconds  Reps Notes/Cues          Wrist Flexors Stretch: sup & pro Elbow ext 30\" 2    Wrist Extensor Stretch: sup & pro Elbow ext             Wrist Strengthening against gravity off table: flex/ext/RD/UD  *Add eccentric lower x 3s 5# 2 10    Supination/pronation with long lever arm from weight     Therabar Flex/Ext Twist     OT Wrist Maze x 2     Twist Bar w/ TB to roll     Hammer Curl 5# 2 10                  Therapeutic Activities (13206)       Patient Education                                   Neuromuscular Re-ed (25472)                                          Manual Intervention (68932)       IASTM - Hawk   - HG 7 sweeps/fans to medial/lateral aspect of forearm  - HG 8 sweeps/fans to medial/lateral aspect of forearm  - HG 9 brush/strum ECU, ECRB, and FCU with maintained hold on TP spots  15'     Post Cap mobs       Thoracic/Rib manipualtion       CT MT/Mobs       PROM MT                  Pt. Education:  -pt educated on diagnosis, prognosis and expectations for rehab  -all pt questions were answered    Home Exercise Program:  Access Code: [email or web address]   URL: [email or web address]   Date: 09/22/2020   Prepared by: Vannesa Benoit     Exercises   · Standing Wrist Flexion Stretch - 3 reps - 30s hold - 1x daily - 7x weekly   · Wrist Flexor Stretch in Pronation - 3 reps - 30s hold - 1x daily - 7x weekly   · Standing Wrist Extension Stretch - 3 reps - 30s hold - 1x daily - 7x weekly   · Seated Wrist Flexion with Dumbbell - 10 reps - 3 sets - 1x daily - 7x weekly   · Seated Wrist Extension with Dumbbell - 10 reps - 3 sets - 1x daily - 7x weekly   · Seated Wrist Radial Deviation with Dumbbell - 10 reps - 3 sets - 1x daily - 7x weekly   · Seated Wrist Ulnar Deviation with Dumbbell - 10 reps - 3 sets - 1x daily - 7x weekly   · Seated Wrist Supination Pronation with Can - 10 reps - 3 sets - 1x daily - 7x weekly    Therapeutic Exercise and NMR EXR  [x] (61334) Provided verbal/tactile cueing for activities related to strengthening, flexibility, endurance, ROM  for improvements in scapular, scapulothoracic and UE control with self care, reaching, carrying, lifting, house/yardwork, driving/computer work.    [] (95182) Provided verbal/tactile cueing for activities related to improving balance, coordination, kinesthetic sense, posture, motor skill, proprioception  to assist with  scapular, scapulothoracic and UE control with self care, reaching, carrying, lifting, house/yardwork, driving/computer work.  [] (97274) Therapist is in constant attendance of 2 or more patients providing skilled therapy interventions, but not providing any significant amount of measurable one-on-one time to either patient, for improvements in cervical, scapular, scapulothoracic and UE control with self care, reaching, carrying, lifting, house/yardwork, driving, computer work. Therapeutic Activities:    [x] (68057 or 88812) Provided verbal/tactile cueing for activities related to improving balance, coordination, kinesthetic sense, posture, motor skill, proprioception and motor activation to allow for proper function of scapular, scapulothoracic and UE control with self care, carrying, lifting, driving/computer work.      Home Exercise Program:    [x] (90322) Reviewed/Progressed HEP activities related to strengthening, flexibility, endurance, ROM of scapular, scapulothoracic and UE control with self care, reaching, carrying, lifting, house/yardwork, driving/computer work  [] (22988) Reviewed/Progressed HEP activities related to improving balance, coordination, kinesthetic sense, posture, motor skill, proprioception of scapular, scapulothoracic and UE control with self care, reaching, carrying, lifting, house/yardwork, driving/computer work      Manual Treatments:  PROM / STM / Oscillations-Mobs:  G-I, II, III, IV (PA's, Inf., Post.)  [x] (58318) Provided manual therapy to mobilize soft tissue/joints of cervical/CT, scapular GHJ and UE for the purpose of modulating pain, promoting relaxation,  increasing ROM, reducing/eliminating soft tissue swelling/inflammation/restriction, improving soft tissue extensibility and allowing for proper ROM for normal function with self care, reaching, carrying, lifting, house/yardwork, driving/computer work    Modalities:      Charges:  Timed Code Treatment Minutes: 35   Total Treatment Minutes: 35       [] EVAL - LOW (65657)   [] EVAL - MOD (07708)  [] EVAL - HIGH (05118)  [] RE-EVAL (96229)  [x] AE(63469) x 1      [] Ionto  [] NMR (64931) x      [] Vaso  [x] Manual (95585) x 1      [] Ultrasound:  [] TA x       [] Mech Traction (67870)  [] Home Management Training x    [] ES (un) (87762):   [] Aquatic    [] ES(attended) (00616)  [] Other:                     GOALS:  Patient stated goal: return to normal life/exercise, gain strength   []? Progressing: [x]? Met: []? Not Met: []? Adjusted     Therapist goals for Patient:   Short Term Goals: To be achieved in: 2 weeks  1. Independent in HEP and progression per patient tolerance, in order to prevent re-injury. []? Progressing: [x]? Met: []? Not Met: []? Adjusted  2. Patient will have a decrease in pain to facilitate improvement in movement, function, and ADLs as indicated by Functional Deficits. []? Progressing: [x]? Met: []? Not Met: []? Adjusted     Long Term Goals: To be achieved in: 8 weeks  1. Disability index score of 0% or less for the UEFI or Quick DASH to assist with reaching prior level of function. []? Progressing: []? Met: []? Not Met: []? Adjusted  2.  Patient will demonstrate increased AROM to full wrist ROM in all directions w/o pain to allow for proper joint functioning as indicated by patients Functional Deficits. []? Progressing: []? Met: []? Not Met: []? Adjusted  3. Patient will demonstrate an increase in Strength to 5/5 to allow for proper functional mobility as indicated by patients Functional Deficits. []? Progressing: []? Met: []? Not Met: []? Adjusted  4. Patient will return to functional activities including performing work duties such as driving and computer work without increased symptoms or restriction. []? Progressing: []? Met: []? Not Met: []? Adjusted  5. Patient will be able to return to regular workouts including long duration walking, light weight lifting, and exercise routines without restriction. []? Progressing: []? Met: []? Not Met: []? Adjusted    Overall Progression Towards Functional goals/ Treatment Progress Update:  [x] Patient is progressing as expected towards functional goals listed. [] Progression is slowed due to complexities/Impairments listed. [] Progression has been slowed due to co-morbidities. [] Plan just implemented, too soon to assess goals progression <30days   [] Goals require adjustment due to lack of progress  [] Patient is not progressing as expected and requires additional follow up with physician  [] Other    Persisting Functional Limitations/Impairments:  []Sitting []Standing   []Transfers  []Sleeping   [x]Reaching [x]Lifting   [x]ADLs [x]Housework  [x]Driving [x]Job related tasks  []Sports/Recreation []Other:    ASSESSMENT:  Patient responded excellent to IASTM, continued today to decrease last remaining muscle tissue stiffness. Pt does well with increase in therex without any discomfort. Added focused on eccentric lowering in order to work the muscle in an elongated position. Pt to return in 2 weeks if needed for further progressions, otherwise is doing great. Pt understanding and will stay compliant with HEP.  If patient does not return within the next month due to feeling great, she is in agreement with discharge. Treatment/Activity Tolerance:  [x] Pt able to complete treatment [] Patient limited by fatique  [] Patient limited by pain  [] Patient limited by other medical complications  [] Other:     Prognosis:  [x] Good [] Fair  [] Poor    Patient Requires Follow-up: [x] Yes  [] No    Return to Play:    [x]  N/A    PLAN: See eval. PT 1-2x / week for 6-8 weeks. [x] Continue per plan of care [] Alter current plan (see comments)  [] Plan of care initiated [] Hold pending MD visit [] Discharge    Electronically signed by: Hank Evans PT, DPT      Note: If patient does not return for scheduled/ recommended follow up visits, this note will serve as a discharge from care along with most recent update on progress. limited ROM of spine

## 2023-09-08 ENCOUNTER — TELEPHONE (OUTPATIENT)
Dept: FAMILY MEDICINE CLINIC | Age: 53
End: 2023-09-08

## 2023-09-08 DIAGNOSIS — E78.00 PURE HYPERCHOLESTEROLEMIA: ICD-10-CM

## 2023-09-08 DIAGNOSIS — F41.9 ANXIETY: ICD-10-CM

## 2023-09-08 DIAGNOSIS — Z00.00 WELL ADULT EXAM: Primary | ICD-10-CM

## 2023-09-08 RX ORDER — LORAZEPAM 0.5 MG/1
0.5 TABLET ORAL DAILY PRN
Qty: 20 TABLET | Refills: 0 | OUTPATIENT
Start: 2023-09-08 | End: 2023-10-08

## 2023-09-08 RX ORDER — AMITRIPTYLINE HYDROCHLORIDE 10 MG/1
TABLET, FILM COATED ORAL
Qty: 60 TABLET | Refills: 12 | Status: SHIPPED | OUTPATIENT
Start: 2023-09-08

## 2023-09-08 NOTE — TELEPHONE ENCOUNTER
Medication:   Requested Prescriptions     Pending Prescriptions Disp Refills    LORazepam (ATIVAN) 0.5 MG tablet 20 tablet 0     Sig: Take 1 tablet by mouth daily as needed for Anxiety for up to 30 days. amitriptyline (ELAVIL) 10 MG tablet 60 tablet 12     Sig: TAKE ONE TO TWO TABLETS BY MOUTH ONCE NIGHTLY AS NEEDED FOR SLEEP      Last Filled:  9/14/22    Patient Phone Number: [phone] (home) [phone] (work)    Last appt: 4/17/2023   Next appt: 11/7/2023    Last OARRS:   RX Monitoring 11/3/2022   Periodic Controlled Substance Monitoring No signs of potential drug abuse or diversion identified.      PDMP Monitoring:    Last PDMP Rea Correia as Reviewed [facility]):  Review User Review Instant Review Result   Faby Nick 11/3/2022  7:50 AM Reviewed PDMP [1]     Preferred Pharmacy:   [facility] [id number] [place] [phone] [facility] [phone]  [address] [place] [address] Road  [address]  Phone: [phone] Fax: [phone]

## 2023-09-08 NOTE — TELEPHONE ENCOUNTER
----- Message from Ben Fernandez sent at 9/8/2023 11:20 AM EDT -----  Subject: Referral Request    Reason for referral request? pt asking for whatever blood work is needed   for yearly physical   Provider patient wants to be referred to(if known):     Provider Phone Number(if known):     Additional Information for Provider?   ---------------------------------------------------------------------------  --------------  Owen Jber Jayne    [phone]; OK to leave message on voicemail  ---------------------------------------------------------------------------  --------------

## 2023-09-08 NOTE — TELEPHONE ENCOUNTER
----- Message from Suzanne Palmer sent at 9/8/2023 11:23 AM EDT -----  Subject: Refill Request    QUESTIONS  Name of Medication? LORazepam (ATIVAN) 0.5 MG tablet  Patient-reported dosage and instructions? as needed   How many days do you have left? 4  Preferred Pharmacy? [facility] [id number]  Pharmacy phone number (if available)? [phone]  ---------------------------------------------------------------------------  --------------,  Name of Medication? amitriptyline (ELAVIL) 10 MG tablet  Patient-reported dosage and instructions? TAKE ONE TO TWO TABLETS BY MOUTH   ONCE NIGHTLY AS NEEDED FOR SLEEP  How many days do you have left? 45  Preferred Pharmacy? [facility] [id number]  Pharmacy phone number (if available)? [phone]  ---------------------------------------------------------------------------  --------------  Josefa Lopez INFO  What is the best way for the office to contact you? OK to leave message on   voicemail  Preferred Call Back Phone Number? [phone]  ---------------------------------------------------------------------------  --------------  SCRIPT ANSWERS  Relationship to Patient?  Self

## 2023-10-31 ENCOUNTER — HOSPITAL ENCOUNTER (OUTPATIENT)
Age: 53
Discharge: HOME | End: 2023-10-31
Payer: COMMERCIAL

## 2023-10-31 DIAGNOSIS — Z00.00 WELL ADULT EXAM: ICD-10-CM

## 2023-10-31 DIAGNOSIS — E78.00 PURE HYPERCHOLESTEROLEMIA: ICD-10-CM

## 2023-10-31 LAB
ALBUMIN SERPL-MCNC: 4.7 G/DL (ref 3.4–5)
ALBUMIN/GLOB SERPL: 2 {RATIO} (ref 1.1–2.2)
ALP SERPL-CCNC: 69 U/L (ref 40–129)
ALT SERPL-CCNC: 17 U/L (ref 10–40)
ANION GAP SERPL CALCULATED.3IONS-SCNC: 7 MMOL/L (ref 3–16)
AST SERPL-CCNC: 22 U/L (ref 15–37)
BILIRUB SERPL-MCNC: 0.3 MG/DL (ref 0–1)
BUN SERPL-MCNC: 13 MG/DL (ref 7–20)
CALCIUM SERPL-MCNC: 9.5 MG/DL (ref 8.3–10.6)
CHLORIDE SERPL-SCNC: 103 MMOL/L (ref 99–110)
CHOLEST SERPL-MCNC: 244 MG/DL (ref 0–199)
CO2 SERPL-SCNC: 29 MMOL/L (ref 21–32)
CREAT SERPL-MCNC: 0.9 MG/DL (ref 0.6–1.1)
GFR SERPLBLD CREATININE-BSD FMLA CKD-EPI: >60 ML/MIN/{1.73_M2}
GLUCOSE SERPL-MCNC: 90 MG/DL (ref 70–99)
HDLC SERPL-MCNC: 54 MG/DL (ref 40–60)
LDLC SERPL CALC-MCNC: 163 MG/DL
POTASSIUM SERPL-SCNC: 4.6 MMOL/L (ref 3.5–5.1)
PROT SERPL-MCNC: 7.1 G/DL (ref 6.4–8.2)
SODIUM SERPL-SCNC: 139 MMOL/L (ref 136–145)
TRIGL SERPL-MCNC: 135 MG/DL (ref 0–150)
VLDLC SERPL CALC-MCNC: 27 MG/DL

## 2023-10-31 PROCEDURE — 36415 COLL VENOUS BLD VENIPUNCTURE: CPT

## 2023-10-31 PROCEDURE — 80061 LIPID PANEL: CPT

## 2023-10-31 PROCEDURE — 80053 COMPREHEN METABOLIC PANEL: CPT

## 2023-11-07 ENCOUNTER — OFFICE VISIT (OUTPATIENT)
Dept: FAMILY MEDICINE CLINIC | Age: 53
End: 2023-11-07
Payer: COMMERCIAL

## 2023-11-07 VITALS
DIASTOLIC BLOOD PRESSURE: 70 MMHG | OXYGEN SATURATION: 98 % | SYSTOLIC BLOOD PRESSURE: 120 MMHG | TEMPERATURE: 96.6 F | HEART RATE: 77 BPM | WEIGHT: 142 LBS | BODY MASS INDEX: 24.24 KG/M2 | HEIGHT: 64 IN

## 2023-11-07 DIAGNOSIS — Z00.00 WELL ADULT EXAM: Primary | ICD-10-CM

## 2023-11-07 DIAGNOSIS — E78.00 PURE HYPERCHOLESTEROLEMIA: ICD-10-CM

## 2023-11-07 DIAGNOSIS — K63.5 POLYP OF COLON, UNSPECIFIED PART OF COLON, UNSPECIFIED TYPE: ICD-10-CM

## 2023-11-07 DIAGNOSIS — F41.9 ANXIETY: ICD-10-CM

## 2023-11-07 PROCEDURE — G8484 FLU IMMUNIZE NO ADMIN: HCPCS | Performed by: FAMILY MEDICINE

## 2023-11-07 PROCEDURE — 99396 PREV VISIT EST AGE 40-64: CPT | Performed by: FAMILY MEDICINE

## 2023-11-07 RX ORDER — MELOXICAM 15 MG/1
15 TABLET ORAL DAILY PRN
Qty: 30 TABLET | Refills: 4 | Status: SHIPPED | OUTPATIENT
Start: 2023-11-07

## 2023-11-07 RX ORDER — LORAZEPAM 0.5 MG/1
0.5 TABLET ORAL DAILY PRN
Qty: 20 TABLET | Refills: 0 | Status: SHIPPED | OUTPATIENT
Start: 2023-11-07 | End: 2023-12-07

## 2023-11-07 ASSESSMENT — PATIENT HEALTH QUESTIONNAIRE - PHQ9
SUM OF ALL RESPONSES TO PHQ QUESTIONS 1-9: 0
SUM OF ALL RESPONSES TO PHQ QUESTIONS 1-9: 0
SUM OF ALL RESPONSES TO PHQ9 QUESTIONS 1 & 2: 0
SUM OF ALL RESPONSES TO PHQ QUESTIONS 1-9: 0
SUM OF ALL RESPONSES TO PHQ QUESTIONS 1-9: 0
1. LITTLE INTEREST OR PLEASURE IN DOING THINGS: 0
2. FEELING DOWN, DEPRESSED OR HOPELESS: 0

## 2023-11-09 RX ORDER — AMITRIPTYLINE HYDROCHLORIDE 10 MG/1
TABLET, FILM COATED ORAL
Qty: 60 TABLET | Refills: 10 | Status: SHIPPED | OUTPATIENT
Start: 2023-11-09

## 2024-04-02 ENCOUNTER — HOSPITAL ENCOUNTER (OUTPATIENT)
Dept: WOMENS IMAGING | Age: 54
Discharge: HOME | End: 2024-04-02
Payer: COMMERCIAL

## 2024-04-02 VITALS — HEIGHT: 64 IN | BODY MASS INDEX: 23.73 KG/M2 | WEIGHT: 139 LBS

## 2024-04-02 DIAGNOSIS — Z12.31 VISIT FOR SCREENING MAMMOGRAM: ICD-10-CM

## 2024-04-02 PROCEDURE — 77063 BREAST TOMOSYNTHESIS BI: CPT

## 2024-08-26 ENCOUNTER — OFFICE VISIT (OUTPATIENT)
Dept: FAMILY MEDICINE CLINIC | Age: 54
End: 2024-08-26
Payer: COMMERCIAL

## 2024-08-26 ENCOUNTER — OFFICE VISIT (OUTPATIENT)
Age: 54
End: 2024-08-26
Payer: COMMERCIAL

## 2024-08-26 VITALS
HEART RATE: 85 BPM | SYSTOLIC BLOOD PRESSURE: 120 MMHG | DIASTOLIC BLOOD PRESSURE: 66 MMHG | WEIGHT: 135.6 LBS | OXYGEN SATURATION: 99 % | BODY MASS INDEX: 23.28 KG/M2 | TEMPERATURE: 97.5 F

## 2024-08-26 DIAGNOSIS — F41.9 ANXIETY: Primary | ICD-10-CM

## 2024-08-26 DIAGNOSIS — F41.1 GAD (GENERALIZED ANXIETY DISORDER): Primary | ICD-10-CM

## 2024-08-26 PROCEDURE — G8420 CALC BMI NORM PARAMETERS: HCPCS | Performed by: FAMILY MEDICINE

## 2024-08-26 PROCEDURE — 3017F COLORECTAL CA SCREEN DOC REV: CPT | Performed by: FAMILY MEDICINE

## 2024-08-26 PROCEDURE — 1036F TOBACCO NON-USER: CPT | Performed by: FAMILY MEDICINE

## 2024-08-26 PROCEDURE — 90791 PSYCH DIAGNOSTIC EVALUATION: CPT | Performed by: COUNSELOR

## 2024-08-26 PROCEDURE — 99214 OFFICE O/P EST MOD 30 MIN: CPT | Performed by: FAMILY MEDICINE

## 2024-08-26 PROCEDURE — G8428 CUR MEDS NOT DOCUMENT: HCPCS | Performed by: FAMILY MEDICINE

## 2024-08-26 RX ORDER — LORAZEPAM 0.5 MG/1
0.5 TABLET ORAL 2 TIMES DAILY PRN
Qty: 14 TABLET | Refills: 0 | Status: SHIPPED | OUTPATIENT
Start: 2024-08-26 | End: 2024-09-02

## 2024-08-26 NOTE — PROGRESS NOTES
Behavioral Health Consultation  Daniel Aviles [facility]  Behavioral Health Consultant  8/26/2024        Time spent with client or family: 60 minutes  This is patient's first [facility] appointment.    Reason for Consult:  Pt has a history of Generalized anxiety. Was recently in ER for panic attack.    Referring Provider: Pilar Hayden MD    Client and/or guardian provided informed consent for the behavioral health program. Discussed with client and/or guadian model of service to include the limits of confidentiality (i.e. abuse reporting, suicide intervention, etc.) and short-term intervention focused approach.  Family indicated understanding and agreement to limits of confidentiality.  Feedback given to referring provider.    Subjective:  Family:  Lynne resides in her home with her   . Lynne reports that her family relations are generally good.  Family history is significant for depression.  Current stressors reported include: mom having alzheimer,  having a condition that causes him to pass out without any warning, and other stressors with distant family.     Health/Development:  Lynne's educational history includes unsure.  Currently, Lynne is reported to demonstrate self-sufficiency in most areas of self-care.  Current health concerns include panic attacks.  She is currently employed.    Lynne does have difficulties sleeping. Lynne does not have diet concerns.  She consumes caffeine consumes 2-3 diet cokes a day. Regarding exercise, Lynne writer is unsure.      Emotional/Social:  Lynne reports that she does not have a significant legal history.    In regards to attention, hyperactivity, and impulsivity, Lynne reports no symptoms of ADD/ADHD.  Lynne has not  been previously diagnosed with ADHD.  In terms of substance use, Lynne endorses the following: Lynne drinks alcohol occasionally 1-2 drinks.    Lynne does report feeling that she experiences more sadness than others.  Specifically, at this time due to

## 2024-08-26 NOTE — PROGRESS NOTES
Patient is here complaining of anxiety.  States that she has had a few panic attacks.    Symptoms started 2weeks ago.   Inciting events:  had a episode of passing out a week ago.  She had panic attack in ED, felt like her normal panic attacks but had chest pain, ED worked her up and all ok.     This past Saturday was at friend's pool, not drinking, had drank plenty of water. Started feeling hot, like sx starting again. Took a lorazepam and left and by time got home felt better.     Tearfulness: No  Anxious: Yes  Sleep: restless  Anhedonia: No  Feelings of Guilt: No  Fatigue: Yes  Decreased Concentration: maybe, also going through menopause.  Appetite: normal  Psychomotor Agitation: Yes  Psychomotor Retardation: Yes  Interest in Sex: normal      MIL had cardiac surgery  Son getting  on tight timeframe   dx with prostate cancer  Mother's dementia getting worse.          Vitals:    08/26/24 1439   BP: 120/66   Site: Left Upper Arm   Position: Sitting   Cuff Size: Medium Adult   Pulse: 85   Temp: 97.5 °F (36.4 °C)   TempSrc: Infrared   SpO2: 99%   Weight: 61.5 kg (135 lb 9.6 oz)     Wt Readings from Last 3 Encounters:   08/26/24 61.5 kg (135 lb 9.6 oz)   04/02/24 63 kg (139 lb)   11/07/23 64.4 kg (142 lb)     Body mass index is 23.28 kg/m².      11/7/2023     4:01 PM 4/17/2023     3:40 PM 11/3/2022     7:32 AM 9/24/2021     8:19 AM 5/13/2021    11:15 AM 7/21/2020     9:52 AM 9/12/2019     4:38 PM   PHQ Scores   PHQ2 Score 0 0 0 0 0 0 0   PHQ9 Score 0 0 0 0 0 0 0       Interpretation of Total Score Depression Severity: 1-4 = Minimal depression, 5-9 = Mild depression, 10-14 = Moderate depression, 15-19 = Moderately severe depression, 20-27 = Severe depression       GEN: Alert and oriented x 4 NAD, affect appropriate and normal appearing weight, well hydrated, well developed.        ASSESSMENT AND PLAN:       Lynne was seen today for anxiety.    Diagnoses and all orders for this visit:    Anxiety  -      LORazepam (ATIVAN) 0.5 MG tablet; Take 1 tablet by mouth 2 times daily as needed for Anxiety for up to 7 days. Max Daily Amount: 1 mg      Under sig stress recently  Lorazepam prn for anxiety attacks  Discussed if needing more regularly need to consider daily med  Encouraged to look into counseling  Met with CAROLE Aviles MSW today briefly       RTO prn if sx worsening or not imrpoving      Portions of Note per  Lauren Florian CMA AAMA with corrections and edits per Pilar Hayden MD.  I agree with entirety of note and was present and performed history and physical.  I also confirm that the note above accurately reflects all work, treatment, procedures, and medical decision making performed by me, Pilar Hayden MD

## 2024-09-03 PROBLEM — F41.1 GAD (GENERALIZED ANXIETY DISORDER): Status: ACTIVE | Noted: 2024-09-03

## 2024-09-26 ENCOUNTER — TELEPHONE (OUTPATIENT)
Dept: FAMILY MEDICINE CLINIC | Age: 54
End: 2024-09-26

## 2024-09-26 DIAGNOSIS — E78.00 PURE HYPERCHOLESTEROLEMIA: Primary | ICD-10-CM

## 2024-09-26 RX ORDER — AMITRIPTYLINE HYDROCHLORIDE 10 MG/1
TABLET ORAL
Qty: 180 TABLET | Refills: 0 | Status: SHIPPED | OUTPATIENT
Start: 2024-09-26

## 2024-10-25 ENCOUNTER — TELEPHONE (OUTPATIENT)
Dept: FAMILY MEDICINE CLINIC | Age: 54
End: 2024-10-25

## 2024-11-11 RX ORDER — MELOXICAM 15 MG/1
15 TABLET ORAL DAILY PRN
Qty: 30 TABLET | Refills: 0 | Status: SHIPPED | OUTPATIENT
Start: 2024-11-11

## 2024-11-14 ENCOUNTER — HOSPITAL ENCOUNTER (OUTPATIENT)
Age: 54
Discharge: HOME | End: 2024-11-14
Payer: COMMERCIAL

## 2024-11-14 DIAGNOSIS — E78.00 PURE HYPERCHOLESTEROLEMIA: ICD-10-CM

## 2024-11-14 LAB
ALBUMIN SERPL-MCNC: 4.6 G/DL (ref 3.4–5)
ALBUMIN/GLOB SERPL: 1.8 {RATIO} (ref 1.1–2.2)
ALP SERPL-CCNC: 88 U/L (ref 40–129)
ALT SERPL-CCNC: 18 U/L (ref 10–40)
ANION GAP SERPL CALCULATED.3IONS-SCNC: 10 MMOL/L (ref 3–16)
AST SERPL-CCNC: 25 U/L (ref 15–37)
BILIRUB SERPL-MCNC: <0.2 MG/DL (ref 0–1)
BUN SERPL-MCNC: 14 MG/DL (ref 7–20)
CALCIUM SERPL-MCNC: 10.2 MG/DL (ref 8.3–10.6)
CHLORIDE SERPL-SCNC: 104 MMOL/L (ref 99–110)
CHOLEST SERPL-MCNC: 252 MG/DL (ref 0–199)
CO2 SERPL-SCNC: 26 MMOL/L (ref 21–32)
CREAT SERPL-MCNC: 0.8 MG/DL (ref 0.6–1.1)
GFR SERPLBLD CREATININE-BSD FMLA CKD-EPI: 87 ML/MIN/{1.73_M2}
GLUCOSE SERPL-MCNC: 85 MG/DL (ref 70–99)
HDLC SERPL-MCNC: 54 MG/DL (ref 40–60)
LDL CHOLESTEROL: 176 MG/DL
POTASSIUM SERPL-SCNC: 4.6 MMOL/L (ref 3.5–5.1)
PROT SERPL-MCNC: 7.1 G/DL (ref 6.4–8.2)
SODIUM SERPL-SCNC: 140 MMOL/L (ref 136–145)
TRIGL SERPL-MCNC: 108 MG/DL (ref 0–150)
VLDLC SERPL CALC-MCNC: 22 MG/DL

## 2024-11-14 PROCEDURE — 80061 LIPID PANEL: CPT

## 2024-11-14 PROCEDURE — 36415 COLL VENOUS BLD VENIPUNCTURE: CPT

## 2024-11-14 PROCEDURE — 80053 COMPREHEN METABOLIC PANEL: CPT

## 2024-11-16 ASSESSMENT — PATIENT HEALTH QUESTIONNAIRE - PHQ9
SUM OF ALL RESPONSES TO PHQ9 QUESTIONS 1 & 2: 0
2. FEELING DOWN, DEPRESSED OR HOPELESS: NOT AT ALL
1. LITTLE INTEREST OR PLEASURE IN DOING THINGS: NOT AT ALL
SUM OF ALL RESPONSES TO PHQ QUESTIONS 1-9: 0
SUM OF ALL RESPONSES TO PHQ9 QUESTIONS 1 & 2: 0
1. LITTLE INTEREST OR PLEASURE IN DOING THINGS: NOT AT ALL
SUM OF ALL RESPONSES TO PHQ QUESTIONS 1-9: 0
2. FEELING DOWN, DEPRESSED OR HOPELESS: NOT AT ALL

## 2024-11-19 ENCOUNTER — OFFICE VISIT (OUTPATIENT)
Dept: FAMILY MEDICINE CLINIC | Age: 54
End: 2024-11-19

## 2024-11-19 VITALS
WEIGHT: 137.8 LBS | OXYGEN SATURATION: 99 % | HEIGHT: 64 IN | BODY MASS INDEX: 23.52 KG/M2 | DIASTOLIC BLOOD PRESSURE: 60 MMHG | HEART RATE: 69 BPM | TEMPERATURE: 97.1 F | SYSTOLIC BLOOD PRESSURE: 90 MMHG

## 2024-11-19 DIAGNOSIS — F41.9 ANXIETY: ICD-10-CM

## 2024-11-19 DIAGNOSIS — Z00.00 WELL ADULT EXAM: Primary | ICD-10-CM

## 2024-11-19 DIAGNOSIS — F51.01 PRIMARY INSOMNIA: ICD-10-CM

## 2024-11-19 DIAGNOSIS — E78.00 PURE HYPERCHOLESTEROLEMIA: ICD-10-CM

## 2024-11-19 RX ORDER — MELOXICAM 15 MG/1
15 TABLET ORAL DAILY PRN
Qty: 30 TABLET | Refills: 3 | Status: SHIPPED | OUTPATIENT
Start: 2024-11-19

## 2024-11-19 RX ORDER — AMITRIPTYLINE HYDROCHLORIDE 10 MG/1
TABLET ORAL
Qty: 180 TABLET | Refills: 3 | Status: SHIPPED | OUTPATIENT
Start: 2024-11-19

## 2024-11-19 SDOH — ECONOMIC STABILITY: FOOD INSECURITY: WITHIN THE PAST 12 MONTHS, YOU WORRIED THAT YOUR FOOD WOULD RUN OUT BEFORE YOU GOT MONEY TO BUY MORE.: NEVER TRUE

## 2024-11-19 SDOH — ECONOMIC STABILITY: FOOD INSECURITY: WITHIN THE PAST 12 MONTHS, THE FOOD YOU BOUGHT JUST DIDN'T LAST AND YOU DIDN'T HAVE MONEY TO GET MORE.: NEVER TRUE

## 2024-11-19 SDOH — ECONOMIC STABILITY: INCOME INSECURITY: HOW HARD IS IT FOR YOU TO PAY FOR THE VERY BASICS LIKE FOOD, HOUSING, MEDICAL CARE, AND HEATING?: NOT HARD AT ALL

## 2024-11-19 NOTE — PROGRESS NOTES
Here for annual physical.  Wants to discuss Amitriptyline and cholesterol     Dental: up-to-date  Eye: up-to-date    Colonoscopy: up-to-date    Pap: had hysterectomy  Mammo: up-to-date    Seatbelt: Always    Exercise:  three times a week, walking, spinning class, and strength training (weights and bands).  Do you eat balanced/healthy meals regularly? No     Living Will and/or Healthcare POA: Yes,   Copy on file        11/16/2024     8:01 AM 11/7/2023     4:01 PM 4/17/2023     3:40 PM 11/3/2022     7:32 AM 9/24/2021     8:19 AM 5/13/2021    11:15 AM 7/21/2020     9:52 AM   PHQ Scores   PHQ2 Score 0 0 0 0 0 0 0   PHQ9 Score 0 0 0 0 0 0 0         Anxiety has been overall well controlled. Hasn't taken lorazepam. Lots of stress but feels like handling well. Hasn't needed clonazepam since last seen but has just in case.     Sleep - amitriptyline works well, occ takes 2. Concerned about association with dementia.       HM reviewed with pt    Patient's medications, allergies, past medical, surgical, social and family histories were reviewed and updated in the EHR as appropriate.    Vitals:    11/19/24 0957   BP: 90/60   Site: Left Upper Arm   Position: Sitting   Cuff Size: Medium Adult   Pulse: 69   Temp: 97.1 °F (36.2 °C)   TempSrc: Infrared   SpO2: 99%   Weight: 62.5 kg (137 lb 12.8 oz)   Height: 1.632 m (5' 4.25\")     Wt Readings from Last 3 Encounters:   11/19/24 62.5 kg (137 lb 12.8 oz)   08/26/24 61.5 kg (135 lb 9.6 oz)   04/02/24 63 kg (139 lb)     Body mass index is 23.47 kg/m².      GENERAL Alert and oriented x 4 NAD, affect appropriate and normal appearing weight, well hydrated, well developed.  NECK:supple and non tender without mass, no thyromegaly or thyroid nodules, no cervical lymphadenopathy  HEENT: TM clear bilaterally  LUNG:clear to auscultation bilaterally with normal respiratory effort  CV: Normal heart sounds, regular rate and rhythm without murmurs  EXTREMETY: no loss of hair, no edema, normal pedal

## 2024-12-10 ENCOUNTER — HOSPITAL ENCOUNTER (OUTPATIENT)
Age: 54
Setting detail: OBSERVATION
Discharge: HOME | End: 2024-12-11
Attending: EMERGENCY MEDICINE | Admitting: STUDENT IN AN ORGANIZED HEALTH CARE EDUCATION/TRAINING PROGRAM
Payer: COMMERCIAL

## 2024-12-10 ENCOUNTER — APPOINTMENT (OUTPATIENT)
Dept: GENERAL RADIOLOGY | Age: 54
End: 2024-12-10
Payer: COMMERCIAL

## 2024-12-10 DIAGNOSIS — R07.9 CHEST PAIN, UNSPECIFIED TYPE: Primary | ICD-10-CM

## 2024-12-10 LAB
ALBUMIN SERPL-MCNC: 4.8 G/DL (ref 3.4–5)
ALBUMIN/GLOB SERPL: 1.5 {RATIO} (ref 1.1–2.2)
ALP SERPL-CCNC: 113 U/L (ref 40–129)
ALT SERPL-CCNC: 25 U/L (ref 10–40)
ANION GAP SERPL CALCULATED.3IONS-SCNC: 16 MMOL/L (ref 3–16)
AST SERPL-CCNC: 32 U/L (ref 15–37)
BASOPHILS # BLD: 0 K/UL (ref 0–0.2)
BASOPHILS NFR BLD: 0.2 %
BILIRUB SERPL-MCNC: <0.2 MG/DL (ref 0–1)
BUN SERPL-MCNC: 11 MG/DL (ref 7–20)
CALCIUM SERPL-MCNC: 10 MG/DL (ref 8.3–10.6)
CHLORIDE SERPL-SCNC: 101 MMOL/L (ref 99–110)
CO2 SERPL-SCNC: 23 MMOL/L (ref 21–32)
CREAT SERPL-MCNC: 0.8 MG/DL (ref 0.6–1.1)
DEPRECATED RDW RBC AUTO: 12.8 % (ref 12.4–15.4)
EOSINOPHIL # BLD: 0.1 K/UL (ref 0–0.6)
EOSINOPHIL NFR BLD: 1.8 %
GFR SERPLBLD CREATININE-BSD FMLA CKD-EPI: 87 ML/MIN/{1.73_M2}
GLUCOSE SERPL-MCNC: 124 MG/DL (ref 70–99)
HCT VFR BLD AUTO: 41.4 % (ref 36–48)
HGB BLD-MCNC: 14.1 G/DL (ref 12–16)
LYMPHOCYTES # BLD: 1.1 K/UL (ref 1–5.1)
LYMPHOCYTES NFR BLD: 21.8 %
MAGNESIUM SERPL-MCNC: 2.3 MG/DL (ref 1.8–2.4)
MCH RBC QN AUTO: 30.7 PG (ref 26–34)
MCHC RBC AUTO-ENTMCNC: 34 G/DL (ref 31–36)
MCV RBC AUTO: 90.4 FL (ref 80–100)
MONOCYTES # BLD: 0.4 K/UL (ref 0–1.3)
MONOCYTES NFR BLD: 7.3 %
NEUTROPHILS # BLD: 3.4 K/UL (ref 1.7–7.7)
NEUTROPHILS NFR BLD: 68.9 %
PLATELET # BLD AUTO: 284 K/UL (ref 135–450)
PMV BLD AUTO: 8.4 FL (ref 5–10.5)
POTASSIUM SERPL-SCNC: 3.3 MMOL/L (ref 3.5–5.1)
PROT SERPL-MCNC: 8 G/DL (ref 6.4–8.2)
RBC # BLD AUTO: 4.58 M/UL (ref 4–5.2)
SODIUM SERPL-SCNC: 140 MMOL/L (ref 136–145)
TROPONIN, HIGH SENSITIVITY: 34 NG/L (ref 0–14)
TROPONIN, HIGH SENSITIVITY: <6 NG/L (ref 0–14)
WBC # BLD AUTO: 4.9 K/UL (ref 4–11)

## 2024-12-10 PROCEDURE — 84484 ASSAY OF TROPONIN QUANT: CPT

## 2024-12-10 PROCEDURE — 99285 EMERGENCY DEPT VISIT HI MDM: CPT

## 2024-12-10 PROCEDURE — 83735 ASSAY OF MAGNESIUM: CPT

## 2024-12-10 PROCEDURE — 93005 ELECTROCARDIOGRAM TRACING: CPT | Performed by: EMERGENCY MEDICINE

## 2024-12-10 PROCEDURE — 71046 X-RAY EXAM CHEST 2 VIEWS: CPT

## 2024-12-10 PROCEDURE — 6370000000 HC RX 637 (ALT 250 FOR IP)

## 2024-12-10 PROCEDURE — 85025 COMPLETE CBC W/AUTO DIFF WBC: CPT

## 2024-12-10 PROCEDURE — 80053 COMPREHEN METABOLIC PANEL: CPT

## 2024-12-10 RX ORDER — LORAZEPAM 0.5 MG/1
0.5 TABLET ORAL EVERY 6 HOURS PRN
COMMUNITY

## 2024-12-10 RX ORDER — LORAZEPAM 1 MG/1
1 TABLET ORAL EVERY 4 HOURS PRN
Status: DISCONTINUED | OUTPATIENT
Start: 2024-12-10 | End: 2024-12-11

## 2024-12-10 RX ORDER — POTASSIUM CHLORIDE 1500 MG/1
40 TABLET, EXTENDED RELEASE ORAL ONCE
Status: COMPLETED | OUTPATIENT
Start: 2024-12-10 | End: 2024-12-10

## 2024-12-10 RX ADMIN — LORAZEPAM 1 MG: 1 TABLET ORAL at 22:44

## 2024-12-10 RX ADMIN — POTASSIUM CHLORIDE 40 MEQ: 1500 TABLET, EXTENDED RELEASE ORAL at 23:03

## 2024-12-10 ASSESSMENT — ENCOUNTER SYMPTOMS
WHEEZING: 0
VOMITING: 0
SHORTNESS OF BREATH: 0
RHINORRHEA: 0
NAUSEA: 1
COUGH: 0
ABDOMINAL PAIN: 0
DIARRHEA: 1

## 2024-12-10 ASSESSMENT — HEART SCORE
ECG: NON-SPECIFC REPOLARIZATION DISTURBANCE/LBTB/PM
ECG: NON-SPECIFC REPOLARIZATION DISTURBANCE/LBTB/PM

## 2024-12-10 ASSESSMENT — LIFESTYLE VARIABLES
HOW OFTEN DO YOU HAVE A DRINK CONTAINING ALCOHOL: MONTHLY OR LESS
HOW MANY STANDARD DRINKS CONTAINING ALCOHOL DO YOU HAVE ON A TYPICAL DAY: 1 OR 2

## 2024-12-10 ASSESSMENT — PAIN - FUNCTIONAL ASSESSMENT: PAIN_FUNCTIONAL_ASSESSMENT: NONE - DENIES PAIN

## 2024-12-11 ENCOUNTER — APPOINTMENT (OUTPATIENT)
Age: 54
End: 2024-12-11
Attending: INTERNAL MEDICINE
Payer: COMMERCIAL

## 2024-12-11 VITALS
SYSTOLIC BLOOD PRESSURE: 109 MMHG | WEIGHT: 139 LBS | DIASTOLIC BLOOD PRESSURE: 89 MMHG | HEART RATE: 76 BPM | HEIGHT: 64 IN | RESPIRATION RATE: 18 BRPM | BODY MASS INDEX: 23.73 KG/M2 | OXYGEN SATURATION: 100 % | TEMPERATURE: 98.3 F

## 2024-12-11 DIAGNOSIS — I49.9 CARDIAC ARRHYTHMIA, UNSPECIFIED CARDIAC ARRHYTHMIA TYPE: Primary | ICD-10-CM

## 2024-12-11 PROBLEM — Z82.49 FAMILY HISTORY OF HEART VALVE ABNORMALITY: Status: ACTIVE | Noted: 2024-12-11

## 2024-12-11 PROBLEM — R79.89 ELEVATED TROPONIN: Status: ACTIVE | Noted: 2024-12-11

## 2024-12-11 PROBLEM — E78.5 DYSLIPIDEMIA: Status: ACTIVE | Noted: 2024-12-11

## 2024-12-11 PROBLEM — R00.0 RAPID HEARTBEAT: Status: ACTIVE | Noted: 2024-12-11

## 2024-12-11 LAB
ANION GAP SERPL CALCULATED.3IONS-SCNC: 10 MMOL/L (ref 3–16)
B-HCG SERPL EIA 3RD IS-ACNC: <5 MIU/ML
BUN SERPL-MCNC: 10 MG/DL (ref 7–20)
CALCIUM SERPL-MCNC: 9.2 MG/DL (ref 8.3–10.6)
CHLORIDE SERPL-SCNC: 106 MMOL/L (ref 99–110)
CHOLEST SERPL-MCNC: 211 MG/DL (ref 0–199)
CO2 SERPL-SCNC: 23 MMOL/L (ref 21–32)
CREAT SERPL-MCNC: 0.8 MG/DL (ref 0.6–1.1)
DEPRECATED RDW RBC AUTO: 12.7 % (ref 12.4–15.4)
ECHO AO ASC DIAM: 2.6 CM
ECHO AO ASCENDING AORTA INDEX: 1.55 CM/M2
ECHO AO ROOT DIAM: 2.6 CM
ECHO AO ROOT INDEX: 1.55 CM/M2
ECHO AR MAX VEL PISA: 4.1 M/S
ECHO AV AREA PEAK VELOCITY: 2.6 CM2
ECHO AV AREA VTI: 2.6 CM2
ECHO AV AREA/BSA PEAK VELOCITY: 1.5 CM2/M2
ECHO AV AREA/BSA VTI: 1.5 CM2/M2
ECHO AV MEAN GRADIENT: 5 MMHG
ECHO AV MEAN VELOCITY: 1 M/S
ECHO AV PEAK GRADIENT: 7 MMHG
ECHO AV PEAK VELOCITY: 1.4 M/S
ECHO AV REGURGITANT PHT: 587 MS
ECHO AV VELOCITY RATIO: 0.79
ECHO AV VTI: 28.2 CM
ECHO BSA: 1.69 M2
ECHO BSA: 1.69 M2
ECHO EST RA PRESSURE: 3 MMHG
ECHO LA AREA 2C: 14.6 CM2
ECHO LA AREA 4C: 12.4 CM2
ECHO LA DIAMETER INDEX: 1.67 CM/M2
ECHO LA DIAMETER: 2.8 CM
ECHO LA MAJOR AXIS: 4.9 CM
ECHO LA MINOR AXIS: 4.8 CM
ECHO LA TO AORTIC ROOT RATIO: 1.08
ECHO LA VOL BP: 29 ML (ref 22–52)
ECHO LA VOL MOD A2C: 35 ML (ref 22–52)
ECHO LA VOL MOD A4C: 24 ML (ref 22–52)
ECHO LA VOL/BSA BIPLANE: 17 ML/M2 (ref 16–34)
ECHO LA VOLUME INDEX MOD A2C: 21 ML/M2 (ref 16–34)
ECHO LA VOLUME INDEX MOD A4C: 14 ML/M2 (ref 16–34)
ECHO LV E' LATERAL VELOCITY: 12.4 CM/S
ECHO LV E' SEPTAL VELOCITY: 8.41 CM/S
ECHO LV EDV 3D: 77 ML
ECHO LV EDV A2C: 68 ML
ECHO LV EDV A4C: 75 ML
ECHO LV EDV INDEX 3D: 46 ML/M2
ECHO LV EDV INDEX A4C: 45 ML/M2
ECHO LV EDV NDEX A2C: 40 ML/M2
ECHO LV EJECTION FRACTION 3D: 63 %
ECHO LV EJECTION FRACTION A2C: 56 %
ECHO LV EJECTION FRACTION A4C: 65 %
ECHO LV EJECTION FRACTION BIPLANE: 61 % (ref 55–100)
ECHO LV ESV 3D: 29 ML
ECHO LV ESV A2C: 30 ML
ECHO LV ESV A4C: 26 ML
ECHO LV ESV INDEX 3D: 17 ML/M2
ECHO LV ESV INDEX A2C: 18 ML/M2
ECHO LV ESV INDEX A4C: 15 ML/M2
ECHO LV FRACTIONAL SHORTENING: 34 % (ref 28–44)
ECHO LV GLOBAL LONGITUDINAL STRAIN (GLS): -18.8 %
ECHO LV INTERNAL DIMENSION DIASTOLE INDEX: 2.44 CM/M2
ECHO LV INTERNAL DIMENSION DIASTOLIC: 4.1 CM (ref 3.9–5.3)
ECHO LV INTERNAL DIMENSION SYSTOLIC INDEX: 1.61 CM/M2
ECHO LV INTERNAL DIMENSION SYSTOLIC: 2.7 CM
ECHO LV IVSD: 0.6 CM (ref 0.6–0.9)
ECHO LV MASS 2D: 74.3 G (ref 67–162)
ECHO LV MASS 3D INDEX: 48.2 G/M2
ECHO LV MASS 3D: 81 G
ECHO LV MASS INDEX 2D: 44.2 G/M2 (ref 43–95)
ECHO LV POSTERIOR WALL DIASTOLIC: 0.7 CM (ref 0.6–0.9)
ECHO LV RELATIVE WALL THICKNESS RATIO: 0.34
ECHO LVOT AREA: 3.1 CM2
ECHO LVOT AV VTI INDEX: 0.84
ECHO LVOT DIAM: 2 CM
ECHO LVOT MEAN GRADIENT: 3 MMHG
ECHO LVOT PEAK GRADIENT: 5 MMHG
ECHO LVOT PEAK VELOCITY: 1.1 M/S
ECHO LVOT STROKE VOLUME INDEX: 44.3 ML/M2
ECHO LVOT SV: 74.4 ML
ECHO LVOT VTI: 23.7 CM
ECHO MV A VELOCITY: 0.6 M/S
ECHO MV E VELOCITY: 0.71 M/S
ECHO MV E/A RATIO: 1.18
ECHO MV E/E' LATERAL: 5.73
ECHO MV E/E' RATIO (AVERAGED): 7.08
ECHO MV E/E' SEPTAL: 8.44
ECHO PULMONARY ARTERY END DIASTOLIC PRESSURE: 4 MMHG
ECHO PV ACCELERATION TIME (AT): 132 MS
ECHO PV MAX VELOCITY: 1.3 M/S
ECHO PV PEAK GRADIENT: 6 MMHG
ECHO PV REGURGITANT MAX VELOCITY: 1 M/S
ECHO RA AREA 4C: 10.8 CM2
ECHO RA END SYSTOLIC VOLUME APICAL 4 CHAMBER INDEX BSA: 13 ML/M2
ECHO RA VOLUME: 22 ML
ECHO RIGHT VENTRICULAR SYSTOLIC PRESSURE (RVSP): 19 MMHG
ECHO RV BASAL DIMENSION: 3.5 CM
ECHO RV FREE WALL PEAK S': 12.6 CM/S
ECHO RV LONGITUDINAL DIMENSION: 6.8 CM
ECHO RV MID DIMENSION: 2.8 CM
ECHO RV TAPSE: 1.9 CM (ref 1.7–?)
ECHO TV REGURGITANT MAX VELOCITY: 1.97 M/S
ECHO TV REGURGITANT PEAK GRADIENT: 16 MMHG
EKG ATRIAL RATE: 106 BPM
EKG ATRIAL RATE: 107 BPM
EKG ATRIAL RATE: 114 BPM
EKG DIAGNOSIS: NORMAL
EKG P AXIS: 54 DEGREES
EKG P AXIS: 56 DEGREES
EKG P AXIS: 66 DEGREES
EKG P-R INTERVAL: 150 MS
EKG P-R INTERVAL: 152 MS
EKG P-R INTERVAL: 162 MS
EKG Q-T INTERVAL: 338 MS
EKG Q-T INTERVAL: 344 MS
EKG Q-T INTERVAL: 354 MS
EKG QRS DURATION: 78 MS
EKG QRS DURATION: 82 MS
EKG QRS DURATION: 84 MS
EKG QTC CALCULATION (BAZETT): 456 MS
EKG QTC CALCULATION (BAZETT): 465 MS
EKG QTC CALCULATION (BAZETT): 472 MS
EKG R AXIS: 63 DEGREES
EKG R AXIS: 75 DEGREES
EKG R AXIS: 76 DEGREES
EKG T AXIS: 35 DEGREES
EKG T AXIS: 48 DEGREES
EKG T AXIS: 50 DEGREES
EKG VENTRICULAR RATE: 106 BPM
EKG VENTRICULAR RATE: 107 BPM
EKG VENTRICULAR RATE: 114 BPM
GFR SERPLBLD CREATININE-BSD FMLA CKD-EPI: 87 ML/MIN/{1.73_M2}
GLUCOSE SERPL-MCNC: 96 MG/DL (ref 70–99)
HCT VFR BLD AUTO: 36.8 % (ref 36–48)
HDLC SERPL-MCNC: 57 MG/DL (ref 40–60)
HGB BLD-MCNC: 12.6 G/DL (ref 12–16)
LDLC SERPL CALC-MCNC: 138 MG/DL
MCH RBC QN AUTO: 30.9 PG (ref 26–34)
MCHC RBC AUTO-ENTMCNC: 34.2 G/DL (ref 31–36)
MCV RBC AUTO: 90.3 FL (ref 80–100)
NUC STRESS EJECTION FRACTION: 65 %
NUC STRESS LV EDV: 66 ML (ref 56–104)
NUC STRESS LV ESV: 23 ML (ref 19–49)
NUC STRESS LV MASS: 110 G
PLATELET # BLD AUTO: 239 K/UL (ref 135–450)
PMV BLD AUTO: 8.2 FL (ref 5–10.5)
POTASSIUM SERPL-SCNC: 4.1 MMOL/L (ref 3.5–5.1)
RBC # BLD AUTO: 4.07 M/UL (ref 4–5.2)
SODIUM SERPL-SCNC: 139 MMOL/L (ref 136–145)
STRESS BASELINE DIAS BP: 83 MMHG
STRESS BASELINE HR: 89 BPM
STRESS BASELINE SYS BP: 136 MMHG
STRESS ESTIMATED WORKLOAD: 1.9 METS
STRESS EXERCISE DUR MIN: 4 MIN
STRESS EXERCISE DUR SEC: 0 SEC
STRESS O2 SAT PEAK: 98 %
STRESS O2 SAT REST: 98 %
STRESS PEAK DIAS BP: 67 MMHG
STRESS PEAK SYS BP: 127 MMHG
STRESS PERCENT HR ACHIEVED: 83 %
STRESS POST PEAK HR: 137 BPM
STRESS RATE PRESSURE PRODUCT: NORMAL BPM*MMHG
STRESS TARGET HR: 166 BPM
TID: 0.95
TRIGL SERPL-MCNC: 78 MG/DL (ref 0–150)
TROPONIN, HIGH SENSITIVITY: <6 NG/L (ref 0–14)
VLDLC SERPL CALC-MCNC: 16 MG/DL
WBC # BLD AUTO: 5.5 K/UL (ref 4–11)

## 2024-12-11 PROCEDURE — 6370000000 HC RX 637 (ALT 250 FOR IP): Performed by: PHYSICIAN ASSISTANT

## 2024-12-11 PROCEDURE — 85027 COMPLETE CBC AUTOMATED: CPT

## 2024-12-11 PROCEDURE — 80061 LIPID PANEL: CPT

## 2024-12-11 PROCEDURE — 6360000002 HC RX W HCPCS: Performed by: INTERNAL MEDICINE

## 2024-12-11 PROCEDURE — 84702 CHORIONIC GONADOTROPIN TEST: CPT

## 2024-12-11 PROCEDURE — 78452 HT MUSCLE IMAGE SPECT MULT: CPT | Performed by: INTERNAL MEDICINE

## 2024-12-11 PROCEDURE — 3430000000 HC RX DIAGNOSTIC RADIOPHARMACEUTICAL: Performed by: INTERNAL MEDICINE

## 2024-12-11 PROCEDURE — 93016 CV STRESS TEST SUPVJ ONLY: CPT | Performed by: INTERNAL MEDICINE

## 2024-12-11 PROCEDURE — 84484 ASSAY OF TROPONIN QUANT: CPT

## 2024-12-11 PROCEDURE — 78452 HT MUSCLE IMAGE SPECT MULT: CPT

## 2024-12-11 PROCEDURE — 93018 CV STRESS TEST I&R ONLY: CPT | Performed by: INTERNAL MEDICINE

## 2024-12-11 PROCEDURE — 93306 TTE W/DOPPLER COMPLETE: CPT

## 2024-12-11 PROCEDURE — 93306 TTE W/DOPPLER COMPLETE: CPT | Performed by: INTERNAL MEDICINE

## 2024-12-11 PROCEDURE — 84443 ASSAY THYROID STIM HORMONE: CPT

## 2024-12-11 PROCEDURE — G0378 HOSPITAL OBSERVATION PER HR: HCPCS

## 2024-12-11 PROCEDURE — 93010 ELECTROCARDIOGRAM REPORT: CPT | Performed by: INTERNAL MEDICINE

## 2024-12-11 PROCEDURE — 2580000003 HC RX 258: Performed by: PHYSICIAN ASSISTANT

## 2024-12-11 PROCEDURE — 93017 CV STRESS TEST TRACING ONLY: CPT

## 2024-12-11 PROCEDURE — 36415 COLL VENOUS BLD VENIPUNCTURE: CPT

## 2024-12-11 PROCEDURE — 80048 BASIC METABOLIC PNL TOTAL CA: CPT

## 2024-12-11 PROCEDURE — A9502 TC99M TETROFOSMIN: HCPCS | Performed by: INTERNAL MEDICINE

## 2024-12-11 RX ORDER — FAMOTIDINE 20 MG/1
20 TABLET, FILM COATED ORAL 2 TIMES DAILY
Status: DISCONTINUED | OUTPATIENT
Start: 2024-12-11 | End: 2024-12-11 | Stop reason: HOSPADM

## 2024-12-11 RX ORDER — ENOXAPARIN SODIUM 100 MG/ML
40 INJECTION SUBCUTANEOUS DAILY
Status: DISCONTINUED | OUTPATIENT
Start: 2024-12-11 | End: 2024-12-11 | Stop reason: HOSPADM

## 2024-12-11 RX ORDER — FLUTICASONE PROPIONATE 50 MCG
1 SPRAY, SUSPENSION (ML) NASAL DAILY PRN
Status: DISCONTINUED | OUTPATIENT
Start: 2024-12-11 | End: 2024-12-11 | Stop reason: HOSPADM

## 2024-12-11 RX ORDER — MAGNESIUM SULFATE IN WATER 40 MG/ML
2000 INJECTION, SOLUTION INTRAVENOUS PRN
Status: DISCONTINUED | OUTPATIENT
Start: 2024-12-11 | End: 2024-12-11 | Stop reason: HOSPADM

## 2024-12-11 RX ORDER — SENNOSIDES A AND B 8.6 MG/1
1 TABLET, FILM COATED ORAL DAILY PRN
Status: DISCONTINUED | OUTPATIENT
Start: 2024-12-11 | End: 2024-12-11 | Stop reason: HOSPADM

## 2024-12-11 RX ORDER — LORAZEPAM 0.5 MG/1
0.5 TABLET ORAL EVERY 6 HOURS PRN
Status: DISCONTINUED | OUTPATIENT
Start: 2024-12-11 | End: 2024-12-11 | Stop reason: HOSPADM

## 2024-12-11 RX ORDER — ACETAMINOPHEN 325 MG/1
650 TABLET ORAL EVERY 6 HOURS PRN
Status: DISCONTINUED | OUTPATIENT
Start: 2024-12-11 | End: 2024-12-11 | Stop reason: HOSPADM

## 2024-12-11 RX ORDER — ATORVASTATIN CALCIUM 40 MG/1
40 TABLET, FILM COATED ORAL NIGHTLY
Status: DISCONTINUED | OUTPATIENT
Start: 2024-12-11 | End: 2024-12-11 | Stop reason: HOSPADM

## 2024-12-11 RX ORDER — PANTOPRAZOLE SODIUM 40 MG/1
40 TABLET, DELAYED RELEASE ORAL
Qty: 60 TABLET | Refills: 0 | Status: SHIPPED | OUTPATIENT
Start: 2024-12-11

## 2024-12-11 RX ORDER — NITROGLYCERIN 0.4 MG/1
0.4 TABLET SUBLINGUAL EVERY 5 MIN PRN
Status: DISCONTINUED | OUTPATIENT
Start: 2024-12-11 | End: 2024-12-11 | Stop reason: HOSPADM

## 2024-12-11 RX ORDER — SODIUM CHLORIDE 9 MG/ML
INJECTION, SOLUTION INTRAVENOUS PRN
Status: DISCONTINUED | OUTPATIENT
Start: 2024-12-11 | End: 2024-12-11 | Stop reason: HOSPADM

## 2024-12-11 RX ORDER — MELOXICAM 7.5 MG/1
15 TABLET ORAL DAILY PRN
Status: DISCONTINUED | OUTPATIENT
Start: 2024-12-11 | End: 2024-12-11 | Stop reason: HOSPADM

## 2024-12-11 RX ORDER — ACETAMINOPHEN 650 MG/1
650 SUPPOSITORY RECTAL EVERY 6 HOURS PRN
Status: DISCONTINUED | OUTPATIENT
Start: 2024-12-11 | End: 2024-12-11 | Stop reason: HOSPADM

## 2024-12-11 RX ORDER — SUCRALFATE 1 G/1
1 TABLET ORAL 4 TIMES DAILY
Qty: 28 TABLET | Refills: 0 | Status: SHIPPED | OUTPATIENT
Start: 2024-12-11 | End: 2024-12-18

## 2024-12-11 RX ORDER — DIPHENHYDRAMINE HCL 25 MG
25 TABLET ORAL EVERY 6 HOURS PRN
Status: DISCONTINUED | OUTPATIENT
Start: 2024-12-11 | End: 2024-12-11 | Stop reason: HOSPADM

## 2024-12-11 RX ORDER — POTASSIUM CHLORIDE 1500 MG/1
40 TABLET, EXTENDED RELEASE ORAL PRN
Status: DISCONTINUED | OUTPATIENT
Start: 2024-12-11 | End: 2024-12-11 | Stop reason: HOSPADM

## 2024-12-11 RX ORDER — REGADENOSON 0.08 MG/ML
0.4 INJECTION, SOLUTION INTRAVENOUS
Status: DISCONTINUED | OUTPATIENT
Start: 2024-12-11 | End: 2024-12-11 | Stop reason: HOSPADM

## 2024-12-11 RX ORDER — SODIUM CHLORIDE 0.9 % (FLUSH) 0.9 %
10 SYRINGE (ML) INJECTION PRN
Status: DISCONTINUED | OUTPATIENT
Start: 2024-12-11 | End: 2024-12-11 | Stop reason: HOSPADM

## 2024-12-11 RX ORDER — REGADENOSON 0.08 MG/ML
0.4 INJECTION, SOLUTION INTRAVENOUS
Status: COMPLETED | OUTPATIENT
Start: 2024-12-11 | End: 2024-12-11

## 2024-12-11 RX ORDER — POTASSIUM CHLORIDE 7.45 MG/ML
10 INJECTION INTRAVENOUS PRN
Status: DISCONTINUED | OUTPATIENT
Start: 2024-12-11 | End: 2024-12-11 | Stop reason: HOSPADM

## 2024-12-11 RX ORDER — SODIUM CHLORIDE 0.9 % (FLUSH) 0.9 %
10 SYRINGE (ML) INJECTION EVERY 12 HOURS SCHEDULED
Status: DISCONTINUED | OUTPATIENT
Start: 2024-12-11 | End: 2024-12-11 | Stop reason: HOSPADM

## 2024-12-11 RX ORDER — AMITRIPTYLINE HYDROCHLORIDE 10 MG/1
10 TABLET ORAL NIGHTLY PRN
Status: DISCONTINUED | OUTPATIENT
Start: 2024-12-11 | End: 2024-12-11 | Stop reason: HOSPADM

## 2024-12-11 RX ORDER — ASPIRIN 81 MG/1
81 TABLET, CHEWABLE ORAL DAILY
Status: DISCONTINUED | OUTPATIENT
Start: 2024-12-11 | End: 2024-12-11 | Stop reason: HOSPADM

## 2024-12-11 RX ORDER — ONDANSETRON 2 MG/ML
4 INJECTION INTRAMUSCULAR; INTRAVENOUS EVERY 6 HOURS PRN
Status: DISCONTINUED | OUTPATIENT
Start: 2024-12-11 | End: 2024-12-11 | Stop reason: HOSPADM

## 2024-12-11 RX ADMIN — REGADENOSON 0.4 MG: 0.08 INJECTION, SOLUTION INTRAVENOUS at 11:17

## 2024-12-11 RX ADMIN — FAMOTIDINE 20 MG: 20 TABLET, FILM COATED ORAL at 09:04

## 2024-12-11 RX ADMIN — AMITRIPTYLINE HYDROCHLORIDE 10 MG: 10 TABLET, FILM COATED ORAL at 01:54

## 2024-12-11 RX ADMIN — ASPIRIN 81 MG: 81 TABLET, CHEWABLE ORAL at 09:04

## 2024-12-11 RX ADMIN — TETROFOSMIN 11.2 MILLICURIE: 1.38 INJECTION, POWDER, LYOPHILIZED, FOR SOLUTION INTRAVENOUS at 09:55

## 2024-12-11 RX ADMIN — TETROFOSMIN 31 MILLICURIE: 1.38 INJECTION, POWDER, LYOPHILIZED, FOR SOLUTION INTRAVENOUS at 11:18

## 2024-12-11 RX ADMIN — LORAZEPAM 0.5 MG: 0.5 TABLET ORAL at 16:49

## 2024-12-11 RX ADMIN — DIPHENHYDRAMINE HYDROCHLORIDE 25 MG: 25 TABLET ORAL at 14:58

## 2024-12-11 RX ADMIN — SODIUM CHLORIDE, PRESERVATIVE FREE 10 ML: 5 INJECTION INTRAVENOUS at 09:04

## 2024-12-11 NOTE — PROGRESS NOTES
Dr Sanchez is okay for her to discharge per cardiology nurse. Cards will make f/u appt regarding heart monitor read

## 2024-12-11 NOTE — ED NOTES
Patient Name: Lynne Muir  : 1970 54 y.o.  MRN: [id number]  ED Room #: ED-0007/07     Chief complaint:   Chief Complaint   Patient presents with    Chest Pain     Pt via [place] em[facility] w/ cc of chest pain and anxiety. PT states sudden burning sensation in center of chest about one hour ago. PT states she also feels nauseous. Hx of anxiety. Pt took 0.5mg of lorazepam before coming. Given 324 of asprin and 4 of zofran in route.      Hospital Problem/Diagnosis:   Hospital Problems             Last Modified POA    * (Principal) Chest pain 12/10/2024 Yes         O2 Flow Rate:    (if applicable)  Cardiac Rhythm:   (if applicable)  Active LDA's:           How does patient ambulate? Low Fall Risk (Ambulates by themselves without support    2. How does patient take pills? Whole with Water    3. Is patient alert? Alert    4. Is patient oriented? To Person, To Place, To Time, To Situation, and Follows Commands    5.   Patient arrived from:  home  Facility Name: ___________________________________________    6. If patient is disoriented or from a Skill Nursing Facility has family been notified of admission?       7. Patient belongings? Belongings: Cell Phone and Clothing    Disposition of belongings? Kept with Patient     8. Any specific patient or family belongings/needs/dynamics?   a.     9. Miscellaneous comments/pending orders?  a.       If there are any additional questions please reach out to the Emergency Department.

## 2024-12-11 NOTE — H&P
Hospital Medicine History & Physical        Name:  Lynne Muir /Age/Sex: 1970  (54 y.o. female)   MRN & CSN:  [id number] & [id number] Encounter Date/Time: 12/10/2024 11:47 PM EST   Location:  ED- PCP: Pilar Hayden MD         CHIEF COMPLAINT:   Chief Complaint   Patient presents with    Chest Pain     Pt via [place] ems w/ cc of chest pain and anxiety. PT states sudden burning sensation in center of chest about one hour ago. PT states she also feels nauseous. Hx of anxiety. Pt took 0.5mg of lorazepam before coming. Given 324 of asprin and 4 of zofran in route.          HISTORY OF PRESENT ILLNESS:      History from: patient  Limitations to history : None     Lynne Muir is a 54 y.o. female who presented to ED via EMS with burning sensation in the center of her chest.  Patient reports associated anxiety.  She reports she has had extreme stress recently dealing with health problems with both her parents.  She reports she felt nauseous and was sweaty.  She takes ativan PRN for anxiety but reports she does not take it regularly.  She took a dose of ativan to see if that helped.  However, when chest pain persisted she called 911.  She was given aspirin and Zofran en route.      Workup initiated in ED.  EKG x 2 notable for sinus tachycardia without evidence of acute ischemia or infarction.  Troponin <6 ? 34.  Potassium 3.3.  Remainder of workup unremarkable.      REVIEW OF SYSTEMS:   Pertinent positives as noted in the HPI. All other systems reviewed and negative.      PHYSICAL EXAM PERFORMED:  BP (!) 143/82   Pulse 88   Temp 98.2 °F (36.8 °C) (Oral)   Resp 21   Ht 1.626 m (5' 4\")   Wt 61.7 kg (136 lb)   SpO2 100%   BMI 23.34 kg/m²     General appearance:  Awake, alert, no apparent distress  HEENT:  Normocephalic, atraumatic without obvious deformity. PERRL. EOM intact. Conjunctivae/corneas clear.  Neck:  Supple, with full range of motion. No JVD. Trachea

## 2024-12-11 NOTE — DISCHARGE SUMMARY
Hospital Medicine Discharge Summary    Patient: Lynne Muir     Gender: female  : 1970   Age: 54 y.o.  MRN: [id number]    Admitting Physician: Airam Reyez DO  Discharge Physician: Vlad Arriaza MD     Code Status: Full Code     Admit Date: 12/10/2024   Discharge Date:   24    Disposition:  Home    Discharge Diagnoses:    Active Hospital Problems    Diagnosis Date Noted    Rapid heartbeat [R00.0] 2024    Elevated troponin [R79.89] 2024    Family history of heart valve abnormality [Z82.49] 2024    Dyslipidemia [E78.5] 2024    Chest pain [R07.9] 12/10/2024       Follow-up appointments:  one week    Outpatient to do list: F/U with PCP.    Condition at Discharge:  Stable    Hospital Course:   53 yo F with h/o anxiety, GERD came to ER with CP.  Placed in observation.    Seen by Cardio.    Echo:    Left Ventricle: Normal left ventricular systolic function with a visually estimated EF of 60 - 65%. EF by 2D Simpsons Biplane is 61%. EF by 3D is 63%. Left ventricle size is normal. Normal wall thickness. Normal wall motion. Global longitudinal strain is normal with a value of -18.8%. Normal diastolic function.    Right Ventricle: Right ventricle size is normal. Normal systolic function.    Aortic Valve: Mild regurgitation with a centrally directed jet.    Tricuspid Valve: Mild regurgitation. The estimated RVSP is 19 mmHg.    Image quality is good.    SPECT:    Stress Combined Conclusion: The study is negative for myocardial ischemia. Findings suggest a low risk of cardiac events.    Perfusion Defect: There is a mild severity left ventricular perfusion defect that is small in size present in the mid anteroseptal segment(s) at rest with improvement at stress. The defect appears to be an artifact caused by soft tissue.    Perfusion Comments: There is no evidence of inducible ischemia.    Stress Function: Left ventricular function post-stress is normal. Post-stress ejection fraction

## 2024-12-11 NOTE — ED PROVIDER NOTES
[facility] EMERGENCY DEPARTMENT  EMERGENCY DEPARTMENT ENCOUNTER        Pt Name: Lynne Muir  MRN: [id number]  Birthdate 1970  Date of evaluation: 12/10/2024  Provider: Jenifer Flores PA-C  PCP: Pilar Hayden MD  Note Started: 8:51 PM EST 12/10/24       I have seen and evaluated this patient with my supervising physician No att. providers found.      CHIEF COMPLAINT       Chief Complaint   Patient presents with    Chest Pain     Pt via [place] [facility] w/ cc of chest pain and anxiety. PT states sudden burning sensation in center of chest about one hour ago. PT states she also feels nauseous. Hx of anxiety. Pt took 0.5mg of lorazepam before coming. Given 324 of asprin and 4 of zofran in route.        HISTORY OF PRESENT ILLNESS: 1 or more Elements     History From: Patient      Lynne Muir is a 54 y.o. female with a history of panic attacks who presents with a chief complaint of chest pain that had very sudden onset, she described as her heart beating out of her chest, burning localized to her sternum and that possibly radiated to her shoulders, but denies radiation into her neck or her arm.  She states this is felt different than any panic attack she has had in the past, she took her Ativan during the episode and it made her feel little bit better but it did not resolve her symptoms as it normally does.  She states she has been under stress recently and that if her cardiac workup is negative she is interested in starting on daily anxiety medication.  Yesterday during the middle of the day she had an episode of nausea, anxiety and then diarrhea.  She had diarrhea all last night.     Nursing Notes were all reviewed and agreed with or any disagreements were addressed in the HPI.    REVIEW OF SYSTEMS :      Review of Systems   Constitutional:  Negative for appetite change, chills and fever.   HENT:  Negative for congestion and rhinorrhea.    Respiratory:  Negative for cough, shortness of

## 2024-12-11 NOTE — CONSULTS
[facility]      GENERAL CARDIOLOGY CONSULTATION  [phone]        Inpatient consult to Cardiology  Consult performed by: Jeannette Sanchez DO  Consult ordered by: Merna Ayala PA-C  Reason for consult: chest pain          Chief Complaint   Patient presents with    Chest Pain     Pt via [place] [facility] w/ cc of chest pain and anxiety. PT states sudden burning sensation in center of chest about one hour ago. PT states she also feels nauseous. Hx of anxiety. Pt took 0.5mg of lorazepam before coming. Given 324 of asprin and 4 of zofran in route.         ASSESSMENT/PLAN:  1.  Chest discomfort  Atypical chest symptoms attributed to elevated heart rate.  No significant risk factors for heart disease, low ASCVD risk score.  Discussed options for management.  Patient will proceed with stress testing.    2.  Elevated troponin  Isolated single troponin elevation of 34, consider lab error or real value.  EKGs have been nonacute.  Patient's symptoms correlate with rapid heart rate which may reflect an arrhythmia.  She has been symptom-free.  She has low risk factors for obstructive coronary disease.  Have considered a noninvasive pharmacologic stress test.    3.  Rapid heart rate  Patient states that she has had intermittent history of elevated heart rate but yesterday was the worst she has experienced.  Telemetry thus far has been unremarkable.  Presenting EKGs are nonacute.  Consider 30-day monitor at discharge.  Father with history of arrhythmia.  Stress testing for ischemic evaluation and echo for structural evaluation.  Encourage patient stay well-hydrated, avoid excess caffeine and alcohol.    4.  Family history of valvular disorder  Mom with an abnormal valve from birth per patient.  Recommend echo for surveillance imaging.    5.  Dyslipidemia  Marked dyslipidemia with .  Patient states she was working on dietary intervention.  Pending stress test results, consider statin therapy.  ASCVD risk

## 2024-12-11 NOTE — PROGRESS NOTES
CLINICAL PHARMACY NOTE: MEDS TO BEDS    Total # of Prescriptions Filled: 2   The following medications were delivered to the patient:  PANTOPRAZOLE SODIUM 40MG  SUCRALFATE 1GM    Additional Documentation:  Delivered to patients room = signed  Ok to be delivered per RN Bria Magana CPhT

## 2024-12-11 NOTE — ACP (ADVANCE CARE PLANNING)
Advanced Care Planning Note.    Purpose of Encounter: Advanced care planning in light of Chest pain  Parties In Attendance: Patient  Decisional Capacity: Yes  Subjective: Patient with GERD  Objective: Cr 0.8  Goals of Care Determination: Patient wants full support (CPR, vent, surgery, HD, trach, PEG)  Plan:  SPECT, Echo, Cardio consult  Code Status: Full code  Time spent on Advanced care Plannin minutes  Advanced Care Planning Documents: Completed advanced directives on chart,  is the POA.    Vlad Arriaza MD  2024 4:47 PM

## 2024-12-11 NOTE — ED PROVIDER NOTES
I have personally performed a face to face diagnostic evaluation on this patient. I have fully participated in the care of this patient I personally saw the patient and performed a substantive portion of the visit including all aspects of the medical decision making.  I have reviewed and agree with all pertinent clinical information including history, physical exam, diagnostic tests, and the plan.      HPI: Lynne Muir presented with burning sensation in the center of her chest.  Associated with anxiety patient states she has had extreme stress recently dealing with health problems with both her parents.  States she felt nauseous was sweaty.  Has been talking to her primary care doctor about anxiety takes lorazepam intermittently but is not on a daily medication called 911 tonight because chest pain would go away.  Given aspirin and Zofran and route for  Chief Complaint   Patient presents with    Chest Pain     Pt via [place] em[facility] w/ cc of chest pain and anxiety. PT states sudden burning sensation in center of chest about one hour ago. PT states she also feels nauseous. Hx of anxiety. Pt took 0.5mg of lorazepam before coming. Given 324 of asprin and 4 of zofran in route.       Review of Systems: See YVONNE note  Vital Signs: BP (!) 143/82   Pulse 88   Temp 98.2 °F (36.8 °C) (Oral)   Resp 21   Ht 1.626 m (5' 4\")   Wt 61.7 kg (136 lb)   SpO2 100%   BMI 23.34 kg/m²     Alert 54 y.o. female who does not appear toxic or acutely ill  HENT: Atraumatic, oral mucosa moist  Neck: Grossly normal ROM  Chest/Lungs: respiratory effort normal   Abdomen: Soft nontender  Musculoskeletal: Grossly normal ROM  Skin: No palor or diaphoresis    Medical Decision Making and Plan:  Pertinent Labs & Imaging studies reviewed. (See YVONNE chart for details)  I agree with YVONNE assessment and plan.  54-year-old female presents for signs and symptoms concerning for chest pain as well as severe anxiety may be potential panic attack and has

## 2024-12-12 ENCOUNTER — TELEPHONE (OUTPATIENT)
Dept: CARDIOLOGY CLINIC | Age: 54
End: 2024-12-12

## 2024-12-12 LAB — TSH SERPL DL<=0.005 MIU/L-ACNC: 2.18 UIU/ML (ref 0.27–4.2)

## 2024-12-12 NOTE — PROGRESS NOTES
Data- discharge order received, pt verbalized agreement to discharge, disposition to previous residence, no needs for HHC/DME.     Action- discharge instructions prepared and given to pt, pt verbalized understanding. Medication information packet given r/t NEW and/or CHANGED prescriptions emphasizing name/purpose/side effects, pt verbalized understanding. Discharge instruction summary: Diet- gen, Activity- as slime, Primary Care Physician as follows: Pilar Hayden -[phone] f/u appointment , immunizations reviewed, prescription medications filled none. Inpatient surgical procedure precautions reviewed: none   1. WEIGHT: Admit Weight - Scale: 61.7 kg (136 lb) (12/10/24 2041)        Today  Weight - Scale: 63 kg (139 lb) (12/11/24 1027)       2. O2 SAT.: SpO2: 100 % (12/11/24 1201)    Response- Pt belongings gathered, IV removed. Disposition is home (no HHC/DME needs), transported with spouse, ambulated to lobby, no complications.

## 2024-12-12 NOTE — TELEPHONE ENCOUNTER
----- Message from Dr. Jeannette Sanchez DO sent at 12/12/2024  1:36 PM EST -----  Normal.  Please let patient know.

## 2024-12-18 NOTE — PROGRESS NOTES
Lynne Muir is here today to follow up recent Hospital visit.  Was admitted to [facility] on 12/10/24 -12/11/2024.       Symptoms are has improved.  Chest pain resolved.      Wearing a cardiac monitor. Started on PPI BID.     Has been under lot of stress. Father health problems. Recent child wedding. States had eaten a spicy italian sausage meal before sx started. States was watching TV and sudden onset of heart racing, sweating, clammy and burning in chest and pressure. While admitted one elevated Troponin that went back to normal. Stress test and echo normal.     Taking pantoprazole BID x 1 month. Finished 1 week of carafate.  States watching diet, limiting diet soda, caffeine free tea, etc. Thinks \"maybe\" med is helping, less pressure.     Has lorazepam, has used once since home.       Reviewed chart notes, labs and imaging in Care Everywhere and/or Epic    Body mass index is 23.69 kg/m².    Vitals:    12/20/24 1128   BP: 100/80   Site: Left Upper Arm   Position: Sitting   Cuff Size: Medium Adult   Pulse: 89   Temp: 97.2 °F (36.2 °C)   TempSrc: Infrared   SpO2: 98%   Weight: 62.6 kg (138 lb)     Wt Readings from Last 3 Encounters:   12/20/24 62.6 kg (138 lb)   12/11/24 63 kg (139 lb)   11/19/24 62.5 kg (137 lb 12.8 oz)         11/16/2024     8:01 AM 11/7/2023     4:01 PM 4/17/2023     3:40 PM 11/3/2022     7:32 AM 9/24/2021     8:19 AM 5/13/2021    11:15 AM 7/21/2020     9:52 AM   PHQ Scores   PHQ2 Score 0 0 0 0 0 0 0   PHQ9 Score 0 0 0 0 0 0 0       Interpretation of Total Score Depression Severity: 1-4 = Minimal depression, 5-9 = Mild depression, 10-14 = Moderate depression, 15-19 = Moderately severe depression, 20-27 = Severe depression       GENERAL:Alert and oriented x 4 NAD, affect appropriate and normal appearing weight, well hydrated, well developed.  LUNG:clear to auscultation bilaterally with normal respiratory effort  CV: Normal heart sounds, regular rate and rhythm without

## 2024-12-20 ENCOUNTER — OFFICE VISIT (OUTPATIENT)
Dept: FAMILY MEDICINE CLINIC | Age: 54
End: 2024-12-20
Payer: COMMERCIAL

## 2024-12-20 VITALS
BODY MASS INDEX: 23.69 KG/M2 | TEMPERATURE: 97.2 F | HEART RATE: 89 BPM | DIASTOLIC BLOOD PRESSURE: 80 MMHG | SYSTOLIC BLOOD PRESSURE: 100 MMHG | OXYGEN SATURATION: 98 % | WEIGHT: 138 LBS

## 2024-12-20 DIAGNOSIS — R07.89 OTHER CHEST PAIN: ICD-10-CM

## 2024-12-20 DIAGNOSIS — F41.9 ANXIETY: Primary | ICD-10-CM

## 2024-12-20 DIAGNOSIS — R00.0 TACHYCARDIA: ICD-10-CM

## 2024-12-20 PROCEDURE — G8427 DOCREV CUR MEDS BY ELIG CLIN: HCPCS | Performed by: FAMILY MEDICINE

## 2024-12-20 PROCEDURE — G8484 FLU IMMUNIZE NO ADMIN: HCPCS | Performed by: FAMILY MEDICINE

## 2024-12-20 PROCEDURE — 1036F TOBACCO NON-USER: CPT | Performed by: FAMILY MEDICINE

## 2024-12-20 PROCEDURE — G8420 CALC BMI NORM PARAMETERS: HCPCS | Performed by: FAMILY MEDICINE

## 2024-12-20 PROCEDURE — 3017F COLORECTAL CA SCREEN DOC REV: CPT | Performed by: FAMILY MEDICINE

## 2024-12-20 PROCEDURE — 99214 OFFICE O/P EST MOD 30 MIN: CPT | Performed by: FAMILY MEDICINE

## 2024-12-20 RX ORDER — LORAZEPAM 0.5 MG/1
0.5 TABLET ORAL 2 TIMES DAILY PRN
Qty: 14 TABLET | Refills: 0 | Status: SHIPPED | OUTPATIENT
Start: 2024-12-20 | End: 2025-01-19

## 2024-12-20 NOTE — PATIENT INSTRUCTIONS
Daniel Aviles [facility]  Licensed Clinical Counselor  [facility]  Call the office and ask to speak with the office directly to schedule   [phone]  In person and Virtual visits available

## 2025-01-07 ENCOUNTER — TELEPHONE (OUTPATIENT)
Dept: CARDIOLOGY CLINIC | Age: 55
End: 2025-01-07

## 2025-01-07 NOTE — TELEPHONE ENCOUNTER
I spoke with Lynne, she explained that she took her monitor off on Friday , she reports that she \"feels like the monitor captured her symptoms\"  She will mail back in

## 2025-01-07 NOTE — TELEPHONE ENCOUNTER
The patient phoned stating that she wore the heart monitor for 3 weeks, she has not worn since Friday 01/03/25. During her 3rd week she got a notification that the patch was associated with another person and she changed the patch but ran out on Fri.  She states she had a couple of episodes while wearing the monitor, one woke her up and the other was more minor.  She is asking if she can turn in the monitor now or does [facility] want her to wear for another week, and would need another patch.  Please advise  Thank you

## 2025-01-08 ENCOUNTER — TELEPHONE (OUTPATIENT)
Dept: CARDIOLOGY CLINIC | Age: 55
End: 2025-01-08

## 2025-01-08 DIAGNOSIS — I49.9 CARDIAC ARRHYTHMIA, UNSPECIFIED CARDIAC ARRHYTHMIA TYPE: Primary | ICD-10-CM

## 2025-01-08 RX ORDER — DILTIAZEM HYDROCHLORIDE 30 MG/1
30 TABLET, FILM COATED ORAL 2 TIMES DAILY
Qty: 60 TABLET | Refills: 1 | Status: SHIPPED | OUTPATIENT
Start: 2025-01-08

## 2025-01-08 RX ORDER — DILTIAZEM HYDROCHLORIDE 30 MG/1
30 TABLET, FILM COATED ORAL DAILY
Qty: 90 TABLET | Refills: 1 | Status: SHIPPED | OUTPATIENT
Start: 2025-01-08 | End: 2025-01-08 | Stop reason: ALTCHOICE

## 2025-01-08 NOTE — TELEPHONE ENCOUNTER
Pt states Raegan Oneil Tess has not received the script for Cardizem 30 MG.    Please re-send.    Thank you

## 2025-01-08 NOTE — TELEPHONE ENCOUNTER
Pt states she driving and on her way with a co-worker somewhere and for no reason her heart rate went up to 145 and is standing now in the 120's. The only symptom she had was a feeling of her heart pounding. Pt states she was anxious or anything. Pt states she has some ativan with her and could let her co-worker drive if need be. Pt states this is what happens all the time but is being told there is nothing wrong with her heart.

## 2025-01-08 NOTE — TELEPHONE ENCOUNTER
I spoke with Lynne, she reports that she had an episode today while in the car , \"Her  heart rate was elevated to 145\"    She had worn a 30 day monitor, stopped wearing last Friday, will be mailing it back tomorrow.     After discussing with Dr. Sanchez, instructed patient to take Cardizem 30mg, if HR is elevated, if her symptoms and HR are elevated, she may take up to 2 doses per day    I also instructed her to proceed to ED if HR remains elevated and she feels un well    She was instructed to monitor  her BP daily and to please report back to us with her symptoms. She does have an appointment with us 2/6/2025    She verbalizes understanding of all    Rx Sent

## 2025-01-09 ENCOUNTER — APPOINTMENT (OUTPATIENT)
Dept: GENERAL RADIOLOGY | Age: 55
End: 2025-01-09
Payer: COMMERCIAL

## 2025-01-09 ENCOUNTER — OFFICE VISIT (OUTPATIENT)
Age: 55
End: 2025-01-09
Payer: COMMERCIAL

## 2025-01-09 ENCOUNTER — HOSPITAL ENCOUNTER (EMERGENCY)
Age: 55
Discharge: HOME | End: 2025-01-09
Attending: STUDENT IN AN ORGANIZED HEALTH CARE EDUCATION/TRAINING PROGRAM
Payer: COMMERCIAL

## 2025-01-09 VITALS
SYSTOLIC BLOOD PRESSURE: 138 MMHG | OXYGEN SATURATION: 100 % | RESPIRATION RATE: 18 BRPM | BODY MASS INDEX: 22.67 KG/M2 | TEMPERATURE: 98.2 F | DIASTOLIC BLOOD PRESSURE: 73 MMHG | HEART RATE: 83 BPM | WEIGHT: 132.1 LBS

## 2025-01-09 DIAGNOSIS — R00.0 TACHYCARDIA: Primary | ICD-10-CM

## 2025-01-09 DIAGNOSIS — F41.1 GAD (GENERALIZED ANXIETY DISORDER): Primary | ICD-10-CM

## 2025-01-09 LAB
ALBUMIN SERPL-MCNC: 4.8 G/DL (ref 3.4–5)
ALBUMIN/GLOB SERPL: 1.8 {RATIO} (ref 1.1–2.2)
ALP SERPL-CCNC: 104 U/L (ref 40–129)
ALT SERPL-CCNC: 19 U/L (ref 10–40)
ANION GAP SERPL CALCULATED.3IONS-SCNC: 12 MMOL/L (ref 3–16)
AST SERPL-CCNC: 27 U/L (ref 15–37)
BASOPHILS # BLD: 0 K/UL (ref 0–0.2)
BASOPHILS NFR BLD: 0.4 %
BILIRUB SERPL-MCNC: 0.3 MG/DL (ref 0–1)
BUN SERPL-MCNC: 9 MG/DL (ref 7–20)
CALCIUM SERPL-MCNC: 10 MG/DL (ref 8.3–10.6)
CHLORIDE SERPL-SCNC: 104 MMOL/L (ref 99–110)
CO2 SERPL-SCNC: 27 MMOL/L (ref 21–32)
CREAT SERPL-MCNC: 0.9 MG/DL (ref 0.6–1.1)
D-DIMER QUANTITATIVE: <0.27 UG/ML FEU (ref 0–0.6)
DEPRECATED RDW RBC AUTO: 12.7 % (ref 12.4–15.4)
EKG ATRIAL RATE: 94 BPM
EKG DIAGNOSIS: NORMAL
EKG P AXIS: 61 DEGREES
EKG P-R INTERVAL: 148 MS
EKG Q-T INTERVAL: 366 MS
EKG QRS DURATION: 90 MS
EKG QTC CALCULATION (BAZETT): 457 MS
EKG R AXIS: 83 DEGREES
EKG T AXIS: 62 DEGREES
EKG VENTRICULAR RATE: 94 BPM
EOSINOPHIL # BLD: 0 K/UL (ref 0–0.6)
EOSINOPHIL NFR BLD: 0.5 %
GFR SERPLBLD CREATININE-BSD FMLA CKD-EPI: 76 ML/MIN/{1.73_M2}
GLUCOSE SERPL-MCNC: 112 MG/DL (ref 70–99)
HCT VFR BLD AUTO: 40.2 % (ref 36–48)
HGB BLD-MCNC: 13.7 G/DL (ref 12–16)
LYMPHOCYTES # BLD: 0.5 K/UL (ref 1–5.1)
LYMPHOCYTES NFR BLD: 9.9 %
MCH RBC QN AUTO: 31.3 PG (ref 26–34)
MCHC RBC AUTO-ENTMCNC: 34.1 G/DL (ref 31–36)
MCV RBC AUTO: 91.7 FL (ref 80–100)
MONOCYTES # BLD: 0.5 K/UL (ref 0–1.3)
MONOCYTES NFR BLD: 8.5 %
NEUTROPHILS # BLD: 4.5 K/UL (ref 1.7–7.7)
NEUTROPHILS NFR BLD: 80.7 %
NT-PROBNP SERPL-MCNC: 44 PG/ML (ref 0–124)
PLATELET # BLD AUTO: 283 K/UL (ref 135–450)
PMV BLD AUTO: 8 FL (ref 5–10.5)
POTASSIUM SERPL-SCNC: 3.6 MMOL/L (ref 3.5–5.1)
PROT SERPL-MCNC: 7.5 G/DL (ref 6.4–8.2)
RBC # BLD AUTO: 4.39 M/UL (ref 4–5.2)
SODIUM SERPL-SCNC: 143 MMOL/L (ref 136–145)
TROPONIN, HIGH SENSITIVITY: <6 NG/L (ref 0–14)
TROPONIN, HIGH SENSITIVITY: <6 NG/L (ref 0–14)
WBC # BLD AUTO: 5.5 K/UL (ref 4–11)

## 2025-01-09 PROCEDURE — 1036F TOBACCO NON-USER: CPT | Performed by: COUNSELOR

## 2025-01-09 PROCEDURE — 99285 EMERGENCY DEPT VISIT HI MDM: CPT

## 2025-01-09 PROCEDURE — 85025 COMPLETE CBC W/AUTO DIFF WBC: CPT

## 2025-01-09 PROCEDURE — 90834 PSYTX W PT 45 MINUTES: CPT | Performed by: COUNSELOR

## 2025-01-09 PROCEDURE — 80053 COMPREHEN METABOLIC PANEL: CPT

## 2025-01-09 PROCEDURE — 93010 ELECTROCARDIOGRAM REPORT: CPT | Performed by: INTERNAL MEDICINE

## 2025-01-09 PROCEDURE — 85379 FIBRIN DEGRADATION QUANT: CPT

## 2025-01-09 PROCEDURE — 83880 ASSAY OF NATRIURETIC PEPTIDE: CPT

## 2025-01-09 PROCEDURE — 93005 ELECTROCARDIOGRAM TRACING: CPT | Performed by: STUDENT IN AN ORGANIZED HEALTH CARE EDUCATION/TRAINING PROGRAM

## 2025-01-09 PROCEDURE — 84484 ASSAY OF TROPONIN QUANT: CPT

## 2025-01-09 PROCEDURE — 71045 X-RAY EXAM CHEST 1 VIEW: CPT

## 2025-01-09 ASSESSMENT — ENCOUNTER SYMPTOMS
VOMITING: 0
COUGH: 0
CHEST TIGHTNESS: 0
NAUSEA: 1
DIARRHEA: 0
SHORTNESS OF BREATH: 0
ABDOMINAL PAIN: 0

## 2025-01-09 ASSESSMENT — PAIN DESCRIPTION - LOCATION: LOCATION: CHEST

## 2025-01-09 ASSESSMENT — PAIN DESCRIPTION - DESCRIPTORS: DESCRIPTORS: BURNING

## 2025-01-09 ASSESSMENT — PAIN SCALES - GENERAL: PAINLEVEL_OUTOF10: 6

## 2025-01-09 ASSESSMENT — PAIN - FUNCTIONAL ASSESSMENT: PAIN_FUNCTIONAL_ASSESSMENT: 0-10

## 2025-01-09 ASSESSMENT — PAIN DESCRIPTION - PAIN TYPE: TYPE: ACUTE PAIN

## 2025-01-09 NOTE — TELEPHONE ENCOUNTER
Called Lynne Twice, no answer left VM , recommended that she proceed to ED, left message for return call.

## 2025-01-09 NOTE — TELEPHONE ENCOUNTER
Pt states this morning HR elevates to 120.  Went back down quickly. Pt states she did not feel well feels pressure pressure and little bit/ minor tingling in left arm. General feeling of not feeling well.  Her BP this morning was 132/67 HR 79 possible irregular HR     At doctors office BP was normal     Currently at working feeling pretty good only a couple of flip flop feeling.    Pt is a waiting for diltiazem to be ready at pharmacy. Asking if she should take it due to her issues today. Pt is going to wait to here back for direction. Please call ASAP ph [phone]

## 2025-01-09 NOTE — ED PROVIDER NOTES
I have personally performed a face to face diagnostic evaluation on this patient. I have fully participated in the care of this patient I personally saw the patient and performed a substantive portion of the visit including all aspects of the medical decision making.  I have reviewed and agree with all pertinent clinical information including history, physical exam, diagnostic tests, and the plan.    Medical Decision Making    Pt with heart palpitation episodes associated with nausea and chest burning sensation.  Her heart rate was in the 140s.  She was told to come into the emergency room by cardiology after she called them.  These episodes have been ongoing for 10 years.  Just admitted - had cards consult and echo and stress test  She had worn a 30 day monitor, stopped wearing last Friday, will be mailing it back tomorrow. Still waiting on the results of this.      Echo 12/2024 reviewed:  Left Ventricle Normal left ventricular systolic function with a visually estimated EF of 60 - 65%. EF by 2D Simpsons Biplane is 61%. EF by 3D is 63%. Left ventricle size is normal. Normal wall thickness. Normal wall motion. Global longitudinal strain is normal with a value of -18.8%. Normal diastolic function.   Left Atrium Left atrium size is normal.   Right Ventricle Right ventricle size is normal. Normal systolic function.   Right Atrium Right atrium size is normal.   Aortic Valve Trileaflet valve. Mildly thickened cusps. Mild regurgitation with a centrally directed jet. No stenosis.   Mitral Valve Valve structure is normal. Trace regurgitation. No stenosis noted.   Tricuspid Valve Valve structure is normal. Mild regurgitation. No stenosis noted. The estimated RVSP is 19 mmHg.   Pulmonic Valve The pulmonic valve visualization is suboptimal but appears to be functioning normally. Mild regurgitation. No stenosis noted.   Aorta Normal sized aortic root and ascending aorta.   IVC/Hepatic Veins IVC diameter is less than or equal

## 2025-01-09 NOTE — ED PROVIDER NOTES
EKG interpretation by me in absence of a face-to-face evaluation by me as follows:    The 12 lead EKG was interpreted by me independent of a cardiologist as follows:  Rate: normal with a rate of 94  Rhythm: sinus with sinus arrhythmia  Axis: normal  Intervals: prolonged QTc > 500ms  ST segments: no ST elevations or depressions  T waves: no abnormal inversions  Non-specific T wave changes: not present  Prior EKG comparison: EKG dated 12/10/24 is not significantly different        Geoff Ferro MD  01/09/25 [id number]

## 2025-01-09 NOTE — ED PROVIDER NOTES
[facility] EMERGENCY DEPARTMENT  EMERGENCY DEPARTMENT ENCOUNTER        Pt Name: Lynne Muir  MRN: [id number]  Birthdate 1970  Date of evaluation: 1/9/2025  Provider: Mykel Chang PA-C  PCP: Pilar Hayden MD  Note Started: 2:32 PM EST 1/9/25       I have seen and evaluated this patient with my supervising physician Cornelia Nava MD.      CHIEF COMPLAINT       Chief Complaint   Patient presents with    Tachycardia     Pt presents to the ER with the complaint of a tachycardic episode yesterday. Pt states she was driving and her watch notified her she was as high as 145. Pt states she was asymptomatic with the episode. Pt went to her PMD yesterday and began having symptoms this morning including mild chest burning. Pt is currently being evaluated for possible A-fib.        HISTORY OF PRESENT ILLNESS: 1 or more Elements     History from : Patient    Limitations to history : None    Lynne Muir is a 54 y.o. female with a history of panic attacks who presents to the emergency department today at the recommendation of her PCP secondary to multiple episodes of tachycardia with chest burning, palpitations and nausea.  Patient states she has been having these episodes for 10 years.  They are becoming more frequent.  She was admitted at this hospital last month for similar symptoms and had an echocardiogram and stress test which were both normal.  Patient is on diltiazem and Ativan.  Patient states she was discharged with a Holter monitor which she wore for 3 weeks but has not got the results back.  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  She did see her psychiatrist today also.  Patient states there have been episodes this week where she has felt like she cannot catch her breath.  She denies diaphoresis, lightheadedness or dizziness.  She has had no fevers, chills, coughing or recent illness        Nursing Notes were all

## 2025-01-09 NOTE — PROGRESS NOTES
Behavioral Health Consultation  Daniel Aviles [facility]  Licensed Professional Clinical Counselor  1/9/2025         Time spent with client or family: 40 minutes  This is patient's second [facility] appointment.    Reason for Consult:    Chief Complaint   Patient presents with    Anxiety    Depression     Symptoms being triggered by her health and episodes of increased heart rate that starts out of no where. Having more episodes.      Referring Provider: Pilar Hayden MD  [address] [place] [id number]    Feedback given to referring provider.    S:  Patient reports feeling depressed and anxious. Patient has been having more tacicardia episode. Episodes have not only been more frequent but have also been more intense. Things were very stressful for a while in her day to day with family obligations but things are back to normal now. But main trigger is  Heart issues fueling the anxiety. Patient did get a heart monitor test and is working with  cardiologist. So she is feeling like now her symptoms are being seen more seriously instead of just being told it is anxiety. She is waiting on results of heart monitor test. In the mean time she is trying to live normal life to help her gain a sense of control. Appt with cardiologist in Feb.    O:  MSE:  Appearance    cooperative, crying  Appetite normal  Sleep disturbance No  Fatigue Yes  Loss of pleasure Yes  Impulsive behavior No  Speech    normal rate and normal volume  Mood    Depressed   Affect    depressed affect  Thought Content    intact  Thought Process    linear  Associations    logical connections  Insight    Good  Judgment    Intact  Orientation    oriented to person, place, time, and general circumstances  Memory    recent and remote memory intact  Attention/Concentration    intact  Morbid ideation No  Suicide Assessment    no suicidal ideation    A:  Lynne presents for a follow up [facility] appointment regarding concerns related to depression and anxiety.  These

## 2025-01-10 PROBLEM — R79.89 ELEVATED TROPONIN: Status: RESOLVED | Noted: 2024-12-11 | Resolved: 2025-01-10

## 2025-01-10 LAB — TSH SERPL DL<=0.005 MIU/L-ACNC: 2.91 UIU/ML (ref 0.27–4.2)

## 2025-01-30 PROBLEM — Z09 HOSPITAL DISCHARGE FOLLOW-UP: Status: ACTIVE | Noted: 2025-01-30

## 2025-01-30 NOTE — ASSESSMENT & PLAN NOTE
Hospitalized 12/10-12/11 for chest pain and rapid heart rate  Troponin 6-34-6, questionable lab error  ECG non acute  ST, Echo completed without significant findings  Course discussed

## 2025-01-30 NOTE — ASSESSMENT & PLAN NOTE
Chest discomfort  Atypical chest symptoms attributed to elevated heart rate.  No significant risk factors for heart disease, low ASCVD risk score.  Discussed options for management.  Patient will proceed with stress testing  Stress test and Echo completed

## 2025-01-30 NOTE — ASSESSMENT & PLAN NOTE
Lab Results   Component Value Date    CHOL 211 (H) 12/11/2024    TRIG 78 12/11/2024    HDL 57 12/11/2024     (H) 12/11/2024    VLDL 16 12/11/2024     Improved LDL cholesterol when compared to prior lab result  Continue to work on dietary intervention  Recent ST negative for ischemia  ASCVD risk score is low; 1.2%

## 2025-01-30 NOTE — ASSESSMENT & PLAN NOTE
Detailed discussion about patient hospitalization, recent ED visit and testing results  Testing results include: Echo, ST, MCOT  MCOT without complex or sustained arrhythmia, symptoms correspond with normal rhythm  Patient was offered a LOOP monitor if symptoms return of if she feels MCOT did not represent her symptoms  She would like to continue cardizem as a pill in the pocket approach at this time  Also discussed consideration for stopping Elavil as it can contribute to arrhythmia and to eliminate benadryl  She is using conservative measures to work on anxiety  She is actively exercising without cardiac symptoms  Discussed trial of Magnesium Oxide, made patient aware of lack of trial data but not associated with harm

## 2025-01-30 NOTE — PROGRESS NOTES
Ventricle: Normal left ventricular systolic function with a visually estimated EF of 60 - 65%. EF by 2D Simpsons Biplane is 61%. EF by 3D is 63%. Left ventricle size is normal. Normal wall thickness. Normal wall motion. Global longitudinal strain is normal with a value of -18.8%. Normal diastolic function.    Right Ventricle: Right ventricle size is normal. Normal systolic function.    Aortic Valve: Mild regurgitation with a centrally directed jet.    Tricuspid Valve: Mild regurgitation. The estimated RVSP is 19 mmHg.    Image quality is good.    Signed by: Jeannette Sanchez DO on 12/11/2024  4:38 PM    No results found for this or any previous visit.    12/10/24    NM STRESS TEST WITH MYOCARDIAL PERFUSION 12/11/2024  1:16 PM (Final)    Interpretation Summary    Stress Combined Conclusion: The study is negative for myocardial ischemia. Findings suggest a low risk of cardiac events.    Perfusion Defect: There is a mild severity left ventricular perfusion defect that is small in size present in the mid anteroseptal segment(s) at rest with improvement at stress. The defect appears to be an artifact caused by soft tissue.    Perfusion Comments: There is no evidence of inducible ischemia.    Stress Function: Left ventricular function post-stress is normal. Post-stress ejection fraction is 65%. Stress end diastolic volume: 66 mL. Stress end systolic volume: 23 mL. LV mass: 110.0 g.    Perfusion Conclusion: TID ratio is 0.95.    Signed by: Jovon Becker MD on 12/11/2024  1:16 PM    Jeannette Sanchez DO [facility]    Scribe's Attestation: This note was scribed in the presence of Dr.Victoria Daniel BOCANEGRA by Lizabeth Mendoza, RN     Physician Attestation: The scribe's documentation has been prepared under my direction and personally reviewed by me in its entirety. I confirm that the note above accurately reflects all work, treatment, procedures, and medical decision making performed by me.

## 2025-02-06 ENCOUNTER — OFFICE VISIT (OUTPATIENT)
Dept: CARDIOLOGY CLINIC | Age: 55
End: 2025-02-06

## 2025-02-06 VITALS
HEART RATE: 88 BPM | HEIGHT: 64 IN | BODY MASS INDEX: 22.88 KG/M2 | WEIGHT: 134 LBS | DIASTOLIC BLOOD PRESSURE: 64 MMHG | SYSTOLIC BLOOD PRESSURE: 102 MMHG

## 2025-02-06 DIAGNOSIS — E78.5 DYSLIPIDEMIA: ICD-10-CM

## 2025-02-06 DIAGNOSIS — Z09 HOSPITAL DISCHARGE FOLLOW-UP: Primary | ICD-10-CM

## 2025-02-06 DIAGNOSIS — Z82.49 FAMILY HISTORY OF HEART VALVE ABNORMALITY: ICD-10-CM

## 2025-02-06 DIAGNOSIS — R07.89 OTHER CHEST PAIN: ICD-10-CM

## 2025-02-06 DIAGNOSIS — R00.0 RAPID HEARTBEAT: ICD-10-CM

## 2025-02-06 PROBLEM — R07.9 CHEST PAIN: Status: RESOLVED | Noted: 2024-12-10 | Resolved: 2025-02-06

## 2025-02-06 RX ORDER — MAGNESIUM OXIDE 400 MG/1
400 TABLET ORAL 2 TIMES DAILY
Qty: 60 TABLET | Refills: 1 | Status: SHIPPED | OUTPATIENT
Start: 2025-02-06

## 2025-02-06 NOTE — PATIENT INSTRUCTIONS
Hold the course, Continue the Cardizem as needed    Magnesium Oxide 400 mg , take twice per day    Follow up in 3 months

## 2025-02-26 ENCOUNTER — OFFICE VISIT (OUTPATIENT)
Dept: FAMILY MEDICINE CLINIC | Age: 55
End: 2025-02-26

## 2025-02-26 VITALS
BODY MASS INDEX: 24.2 KG/M2 | DIASTOLIC BLOOD PRESSURE: 62 MMHG | WEIGHT: 141 LBS | TEMPERATURE: 98.1 F | HEART RATE: 83 BPM | OXYGEN SATURATION: 98 % | SYSTOLIC BLOOD PRESSURE: 104 MMHG

## 2025-02-26 DIAGNOSIS — I47.10 SVT (SUPRAVENTRICULAR TACHYCARDIA): Primary | ICD-10-CM

## 2025-02-26 DIAGNOSIS — R00.0 RAPID HEARTBEAT: ICD-10-CM

## 2025-02-26 PROBLEM — E78.5 DYSLIPIDEMIA: Status: RESOLVED | Noted: 2024-12-11 | Resolved: 2025-02-26

## 2025-02-26 PROBLEM — Z09 HOSPITAL DISCHARGE FOLLOW-UP: Status: RESOLVED | Noted: 2025-01-30 | Resolved: 2025-02-26

## 2025-02-26 RX ORDER — LORAZEPAM 0.5 MG/1
0.5 TABLET ORAL EVERY 6 HOURS PRN
COMMUNITY

## 2025-02-26 RX ORDER — MAGNESIUM OXIDE 400 MG/1
400 TABLET ORAL 2 TIMES DAILY
Qty: 60 TABLET | Refills: 5 | Status: SHIPPED | OUTPATIENT
Start: 2025-02-26

## 2025-02-26 SDOH — ECONOMIC STABILITY: FOOD INSECURITY: WITHIN THE PAST 12 MONTHS, THE FOOD YOU BOUGHT JUST DIDN'T LAST AND YOU DIDN'T HAVE MONEY TO GET MORE.: NEVER TRUE

## 2025-02-26 SDOH — ECONOMIC STABILITY: FOOD INSECURITY: WITHIN THE PAST 12 MONTHS, YOU WORRIED THAT YOUR FOOD WOULD RUN OUT BEFORE YOU GOT MONEY TO BUY MORE.: NEVER TRUE

## 2025-02-26 ASSESSMENT — PATIENT HEALTH QUESTIONNAIRE - PHQ9
SUM OF ALL RESPONSES TO PHQ QUESTIONS 1-9: 0
1. LITTLE INTEREST OR PLEASURE IN DOING THINGS: NOT AT ALL
SUM OF ALL RESPONSES TO PHQ9 QUESTIONS 1 & 2: 0
2. FEELING DOWN, DEPRESSED OR HOPELESS: NOT AT ALL
SUM OF ALL RESPONSES TO PHQ QUESTIONS 1-9: 0

## 2025-02-26 NOTE — PROGRESS NOTES
Patient is here today to discuss her last cardiology visit.  States that all her symptoms that placed her in the hospital in Dec and Jan has resolved.      States since last seen, saw cardiology, started on cardizem prn. States has only had one episode since then and calmed down on own as hadn't gotten med yet. States feels like doing better overall. Has not had to use lorazepam at all. Feels like anxiety better as well now knows what is going on.    Using magnesium to help sleep and weaning off amitriptyline in case that could be contributing to rhythm issues. Feels like working fairly well although still has some nights doesn't sleep well.     Vitals:    02/26/25 1606   BP: 104/62   Site: Left Upper Arm   Position: Sitting   Cuff Size: Medium Adult   Pulse: 83   Temp: 98.1 °F (36.7 °C)   TempSrc: Infrared   SpO2: 98%   Weight: 64 kg (141 lb)     Wt Readings from Last 3 Encounters:   02/26/25 64 kg (141 lb)   02/06/25 60.8 kg (134 lb)   01/09/25 59.9 kg (132 lb 1.6 oz)     Body mass index is 24.2 kg/m².      2/26/2025     4:06 PM 11/16/2024     8:01 AM 11/7/2023     4:01 PM 4/17/2023     3:40 PM 11/3/2022     7:32 AM 9/24/2021     8:19 AM 5/13/2021    11:15 AM   PHQ Scores   PHQ2 Score 0 0 0 0 0 0 0   PHQ9 Score 0 0 0 0 0 0 0       Interpretation of Total Score Depression Severity: 1-4 = Minimal depression, 5-9 = Mild depression, 10-14 = Moderate depression, 15-19 = Moderately severe depression, 20-27 = Severe depression       GEN: Alert and oriented x 4 NAD, affect appropriate and normal appearing weight, well hydrated, well developed.        ASSESSMENT AND PLAN:       Diagnoses and all orders for this visit:    SVT (supraventricular tachycardia)  -     Magnesium; Future    Rapid heartbeat  -     magnesium oxide (MAG-OX) 400 MG tablet; Take 1 tablet by mouth 2 times daily    Check Mag level since started supplement  F/u with cards as scheduled      f/u at normal annual exam, sooner if needed      Portions of Note

## 2025-03-06 DIAGNOSIS — I47.10 SVT (SUPRAVENTRICULAR TACHYCARDIA): ICD-10-CM

## 2025-03-06 LAB — MAGNESIUM SERPL-MCNC: 2.3 MG/DL (ref 1.8–2.4)

## 2025-04-15 ENCOUNTER — HOSPITAL ENCOUNTER (OUTPATIENT)
Dept: WOMENS IMAGING | Age: 55
Discharge: HOME | End: 2025-04-15
Payer: COMMERCIAL

## 2025-04-15 VITALS — BODY MASS INDEX: 22.71 KG/M2 | WEIGHT: 133 LBS | HEIGHT: 64 IN

## 2025-04-15 DIAGNOSIS — Z12.31 ENCOUNTER FOR SCREENING MAMMOGRAM FOR BREAST CANCER: ICD-10-CM

## 2025-04-15 PROCEDURE — 77063 BREAST TOMOSYNTHESIS BI: CPT

## 2025-04-16 ENCOUNTER — RESULTS FOLLOW-UP (OUTPATIENT)
Dept: FAMILY MEDICINE CLINIC | Age: 55
End: 2025-04-16

## 2025-07-10 ENCOUNTER — OFFICE VISIT (OUTPATIENT)
Dept: FAMILY MEDICINE CLINIC | Age: 55
End: 2025-07-10
Payer: COMMERCIAL

## 2025-07-10 VITALS
OXYGEN SATURATION: 97 % | TEMPERATURE: 98.1 F | WEIGHT: 139 LBS | SYSTOLIC BLOOD PRESSURE: 116 MMHG | BODY MASS INDEX: 23.86 KG/M2 | HEART RATE: 80 BPM | DIASTOLIC BLOOD PRESSURE: 80 MMHG

## 2025-07-10 DIAGNOSIS — M19.041 ARTHRITIS OF BOTH HANDS: ICD-10-CM

## 2025-07-10 DIAGNOSIS — M67.432 GANGLION, LEFT WRIST: Primary | ICD-10-CM

## 2025-07-10 DIAGNOSIS — H91.92 HEARING LOSS OF LEFT EAR, UNSPECIFIED HEARING LOSS TYPE: ICD-10-CM

## 2025-07-10 DIAGNOSIS — M19.042 ARTHRITIS OF BOTH HANDS: ICD-10-CM

## 2025-07-10 DIAGNOSIS — R20.9 ABNORMAL SENSATION OF UPPER EXTREMITY: ICD-10-CM

## 2025-07-10 PROCEDURE — 99214 OFFICE O/P EST MOD 30 MIN: CPT | Performed by: FAMILY MEDICINE

## 2025-07-10 PROCEDURE — G8427 DOCREV CUR MEDS BY ELIG CLIN: HCPCS | Performed by: FAMILY MEDICINE

## 2025-07-10 PROCEDURE — 3017F COLORECTAL CA SCREEN DOC REV: CPT | Performed by: FAMILY MEDICINE

## 2025-07-10 PROCEDURE — G8420 CALC BMI NORM PARAMETERS: HCPCS | Performed by: FAMILY MEDICINE

## 2025-07-10 PROCEDURE — 1036F TOBACCO NON-USER: CPT | Performed by: FAMILY MEDICINE

## 2025-07-10 RX ORDER — UBIDECARENONE 75 MG
50 CAPSULE ORAL DAILY
COMMUNITY

## 2025-07-10 NOTE — PROGRESS NOTES
Chief Complaint   Patient presents with    Cyst     Possible cyst left wrist.   Joint pain/stiffness in both hands.  Itchy skin on inside of right wrist.  Hearing loss in left ear.         History of Present Illness      Cyst  - The patient reports the presence of cysts on left wrist  - She experiences a sensation similar to a severe bruise after attending her strength class twice weekly, particularly when applying pressure to the area.  - She notes a gradual loss of mobility.  - She is considering surgical intervention but is hesitant due to the absence of pain.  - She has been using meloxicam for relief and is curious about its long-term effects.  - Currently, she does not feel the need for daily medication.    Persistent Itch on Right Forearm  - For the past 2 years, she has been experiencing an itch on her right forearm.  - Despite consultations with a dermatologist, no cause has been identified.  - The dermatologist suggested that it might be nerve-related.  - She has tried steroid topical cream and anti-itch cream, but these have not provided relief.  - The itch is intermittent, with some days being more bothersome than others.    Hearing Loss  - She suspects some hearing loss in her left ear, which seems to be worsening.  - She frequently visits plants and uses ear plugs for protection but often removes them as they interfere with her ability to hear.  - Recently, she has noticed difficulty hearing people speaking on her left side, while those on her right side are clear.  - She had tinnitus about 10 years ago and consulted Dr. Weber's office.  - A hearing test at that time revealed some hearing loss in her left ear, but it was not deemed significant enough to require treatment.  - She describes the sensation as if someone has their hand over her ear or as if something is lodged in there.          Vitals:    07/10/25 1136   BP: 116/80   Pulse: 80   Temp: 98.1 °F (36.7 °C)   SpO2: 97%   Weight: 63 kg (139 lb)

## 2025-07-16 ENCOUNTER — PROCEDURE VISIT (OUTPATIENT)
Dept: AUDIOLOGY | Age: 55
End: 2025-07-16
Payer: COMMERCIAL

## 2025-07-16 DIAGNOSIS — H90.42 SENSORINEURAL HEARING LOSS (SNHL) OF LEFT EAR WITH UNRESTRICTED HEARING OF RIGHT EAR: Primary | ICD-10-CM

## 2025-07-16 DIAGNOSIS — H93.8X2 SENSATION OF FULLNESS IN LEFT EAR: ICD-10-CM

## 2025-07-16 PROCEDURE — 92557 COMPREHENSIVE HEARING TEST: CPT

## 2025-07-16 PROCEDURE — 92567 TYMPANOMETRY: CPT

## 2025-07-16 NOTE — PROGRESS NOTES
Lynne Muir   1970, 55 y.o. female   [id number]       Referring Provider: Pilar Hayden MD   Referral Type: In an order in Epic    Reason for Visit: Evaluation of suspected change in hearing, tinnitus, or balance.    ADULT AUDIOLOGIC EVALUATION      Lynne Muir is a 55 y.o. female seen today, 7/16/2025 , for an initial audiologic evaluation.    AUDIOLOGIC AND OTHER PERTINENT MEDICAL HISTORY:      Lynne Muir reports left sided hearing loss for several years that has been gradual. She feels like something is inside her ear that needs to come out. As a child, she had a severe ear infection at age 7 where her ear was stuffed cotton balls as medical management. A couple years later, her school nurse noted her ear had a piece of cotton lodged in there, she is not sure if this caused any damage. Patient does have TMJ and feels like she clenches more on her left side. She wears a  but still experiences some soreness. Admits to recreational noise exposure throughout the years.    She denied otalgia, otorrhea, tinnitus, dizziness, history of head trauma, history of ear surgery, and family history of hearing loss    Date: 7/16/2025     IMPRESSIONS:      Today's results revealed sensorineural hearing loss in the left ear with excellent speech understanding when in quiet, bilaterally. Tympanometry indicates normal middle ear function. Discussed test results and implications with patient. Discussed possible benefits of amplification if patient feels hearing in background noise is bothersome.  .  Follow medical recommendations of Pilar Hayden MD .     ASSESSMENT AND FINDINGS:     Otoscopy unremarkable.    RIGHT EAR:  Hearing Sensitivity:Hearing sensitivity within normal limits from 250-8000 Hz  Speech Recognition Threshold: 15 dB HL  Word Recognition: Excellent 100%, based on NU-6 by difficulty 10-word list at 55 dBHL using recorded speech stimuli.    Tympanometry: Normal peak pressure and

## 2025-07-23 ENCOUNTER — OFFICE VISIT (OUTPATIENT)
Dept: ORTHOPEDIC SURGERY | Age: 55
End: 2025-07-23

## 2025-07-23 VITALS — RESPIRATION RATE: 16 BRPM | BODY MASS INDEX: 23.73 KG/M2 | WEIGHT: 139 LBS | HEIGHT: 64 IN

## 2025-07-23 DIAGNOSIS — S93.491A SPRAIN OF ANTERIOR TALOFIBULAR LIGAMENT OF RIGHT ANKLE, INITIAL ENCOUNTER: Primary | ICD-10-CM

## 2025-07-23 RX ORDER — MELOXICAM 15 MG/1
15 TABLET ORAL DAILY PRN
Qty: 30 TABLET | Refills: 3 | Status: SHIPPED | OUTPATIENT
Start: 2025-07-23

## 2025-07-23 NOTE — PROGRESS NOTES
ORTHOPAEDIC OFFICE NOTE    Chief Complaint   Patient presents with    Ankle Injury     Right ankle injury 7-23-25           HPI  7/23/25  55 y.o. female seen for evaluation of right ankle injury:  The patient stepped in a hole while mowing the lawn and had a inversion injury to the right ankle  She felt a snap  This occurred yesterday evening, July 22  No previous right ankle issues or injury  She is using crutches which she already had at home  Pain is mainly lateral  Rated 4-5 out of 10  Associated with slight bruising and swelling laterally  No numbness or tingling       Allergies   Allergen Reactions    Adhesive Tape     Levaquin [Levofloxacin In D5w]      Panic attack, hot flashes        Current Outpatient Medications   Medication Sig Dispense Refill    vitamin B-12 (CYANOCOBALAMIN) 100 MCG tablet Take 0.5 tablets by mouth daily      CALCIUM-VITAMIN D PO Take by mouth      Potassium 99 MG TABS Take by mouth every other day      LORazepam (ATIVAN) 0.5 MG tablet Take 1 tablet by mouth every 6 hours as needed for Anxiety.      magnesium oxide (MAG-OX) 400 MG tablet Take 1 tablet by mouth 2 times daily 60 tablet 5    dilTIAZem (CARDIZEM) 30 MG tablet Take 1 tablet by mouth 2 times daily Can take BID as needed for elevated HR 60 tablet 1    VITAMIN D PO Take by mouth      fluticasone (FLONASE) 50 MCG/ACT nasal spray 1 spray by Each Nostril route daily       No current facility-administered medications for this visit.       Past Medical History:   Diagnosis Date    Allergic rhinitis     Panic attacks         Past Surgical History:   Procedure Laterality Date    ABDOMINAL EXPLORATION SURGERY  03/12/2013    BREAST SURGERY Right 1999    for blocked duct    COLON SURGERY      COLONOSCOPY  11/2013    repeat in 5-7 years, [place]    HIP SURGERY Left 2010    labral tear, femur acetabullar impigement    HYSTERECTOMY, TOTAL ABDOMINAL (CERVIX REMOVED)  2010    ovaries still there, done for fibroid and pre-cancerous cervical

## 2025-07-23 NOTE — TELEPHONE ENCOUNTER
Medication:   Requested Prescriptions      No prescriptions requested or ordered in this encounter          Patient Phone Number: [phone] (home) [phone] (work)    Last appt: 7/10/2025   Next appt: 11/21/2025    Last OARRS:       11/19/2024     4:37 PM   RX Monitoring   Periodic Controlled Substance Monitoring No signs of potential drug abuse or diversion identified.     PDMP Monitoring:    Last PDMP Jean as Reviewed ([place]):  Review User Review Instant Review Result   JOCELYN SALGADO 12/20/2024 12:06 PM Reviewed PDMP [1]     Preferred Pharmacy:   [facility] [id number] - [place] - [address] [phone] - F [phone]  [address] [place] [address]  Phone: [phone] Fax: [phone]

## 2025-07-23 NOTE — TELEPHONE ENCOUNTER
Needs refill    meloxicam (MOBIC) tablet 15 mg     [facility] [id number] - [place] [address] [phone] - F [phone] [[id number]]

## 2025-07-30 ENCOUNTER — OFFICE VISIT (OUTPATIENT)
Dept: ORTHOPEDIC SURGERY | Age: 55
End: 2025-07-30
Attending: FAMILY MEDICINE

## 2025-07-30 VITALS — BODY MASS INDEX: 23.73 KG/M2 | RESPIRATION RATE: 16 BRPM | WEIGHT: 139 LBS | HEIGHT: 64 IN

## 2025-07-30 DIAGNOSIS — M67.432 GANGLION CYST OF VOLAR ASPECT OF LEFT WRIST: Primary | ICD-10-CM

## 2025-07-30 NOTE — PROGRESS NOTES
Ms. Lynne Muir is a 55 y.o. right handed woman  who is seen today in Hand Surgical Consultation at the request of Pilar Hayden MD.    She presents today regarding left wrist symptoms which have been present for approximately 1 years.  A history of antecedent trauma or injury is Absent.  She reports a mass on the  Radial Volar wrist with symptoms that include Pain with certain activity or position.  Wrist symptoms are exacerbated with flexion.   The size of the mass has been fluctuating with time and change in activity level.  She also notes a skin lesion on the .thenar eminence without     Previous treatment has included conservative measures.  She does not claim relation of her symptoms to her required work activities.  She has not undergone any form of testing.    I have today reviewed with Lynne Muir the clinically relevant, past medical history, medications, allergies,  family history, social history, and Review Of Systems & I have documented any details relevant to today's presenting complaints in my history above.  Ms. Lynne Muir's self-reported past medical history, medications, allergies,  family history, social history, and Review Of Systems have been scanned into the chart under the \"Media\" tab.    Physical Exam:  Ms. Lynne Muir's most recent vitals:  Vitals  Respirations: 16  Height: 162.6 cm (5' 4\")  Weight - Scale: 63 kg (139 lb)    She is well nourished, oriented to person, place & time.  She demonstrates appropriate mood and affect as well as normal gait and station.    Skin: Normal in appearance, Normal Color, and Free of Lesions Bilaterally   she does have a 6 mm pink firm nodule in the skin over the thenar eminence which is nontender, mobile and appears benign  Digital range of motion is Full bilaterally  Wrist range of motion is Full bilaterally.  The mass does not appear to impede wrist range of motion.  Sensation is normal bilaterally  Vascular examination reveals normal

## 2025-08-04 ENCOUNTER — TELEPHONE (OUTPATIENT)
Dept: FAMILY MEDICINE CLINIC | Age: 55
End: 2025-08-04

## 2025-08-05 ENCOUNTER — OFFICE VISIT (OUTPATIENT)
Dept: FAMILY MEDICINE CLINIC | Age: 55
End: 2025-08-05
Payer: COMMERCIAL

## 2025-08-05 VITALS
BODY MASS INDEX: 23.76 KG/M2 | OXYGEN SATURATION: 99 % | HEART RATE: 81 BPM | WEIGHT: 138.4 LBS | SYSTOLIC BLOOD PRESSURE: 110 MMHG | TEMPERATURE: 98.4 F | DIASTOLIC BLOOD PRESSURE: 66 MMHG

## 2025-08-05 DIAGNOSIS — F41.9 ANXIETY: ICD-10-CM

## 2025-08-05 DIAGNOSIS — I47.10 SVT (SUPRAVENTRICULAR TACHYCARDIA): Primary | ICD-10-CM

## 2025-08-05 DIAGNOSIS — E87.6 HYPOKALEMIA: ICD-10-CM

## 2025-08-05 PROCEDURE — G8420 CALC BMI NORM PARAMETERS: HCPCS | Performed by: FAMILY MEDICINE

## 2025-08-05 PROCEDURE — G8427 DOCREV CUR MEDS BY ELIG CLIN: HCPCS | Performed by: FAMILY MEDICINE

## 2025-08-05 PROCEDURE — 1036F TOBACCO NON-USER: CPT | Performed by: FAMILY MEDICINE

## 2025-08-05 PROCEDURE — 3017F COLORECTAL CA SCREEN DOC REV: CPT | Performed by: FAMILY MEDICINE

## 2025-08-05 PROCEDURE — 99214 OFFICE O/P EST MOD 30 MIN: CPT | Performed by: FAMILY MEDICINE

## 2025-08-06 ENCOUNTER — TELEPHONE (OUTPATIENT)
Dept: CARDIOLOGY CLINIC | Age: 55
End: 2025-08-06

## 2025-08-13 ENCOUNTER — OFFICE VISIT (OUTPATIENT)
Age: 55
End: 2025-08-13
Payer: COMMERCIAL

## 2025-08-13 VITALS
DIASTOLIC BLOOD PRESSURE: 70 MMHG | HEIGHT: 64 IN | HEART RATE: 88 BPM | BODY MASS INDEX: 23.56 KG/M2 | WEIGHT: 138 LBS | SYSTOLIC BLOOD PRESSURE: 132 MMHG | OXYGEN SATURATION: 96 %

## 2025-08-13 DIAGNOSIS — I47.10 SVT (SUPRAVENTRICULAR TACHYCARDIA): Primary | ICD-10-CM

## 2025-08-13 PROCEDURE — G8420 CALC BMI NORM PARAMETERS: HCPCS | Performed by: INTERNAL MEDICINE

## 2025-08-13 PROCEDURE — 99204 OFFICE O/P NEW MOD 45 MIN: CPT | Performed by: INTERNAL MEDICINE

## 2025-08-13 PROCEDURE — 3017F COLORECTAL CA SCREEN DOC REV: CPT | Performed by: INTERNAL MEDICINE

## 2025-08-13 PROCEDURE — 1036F TOBACCO NON-USER: CPT | Performed by: INTERNAL MEDICINE

## 2025-08-13 PROCEDURE — 93000 ELECTROCARDIOGRAM COMPLETE: CPT | Performed by: INTERNAL MEDICINE

## 2025-08-13 PROCEDURE — G8427 DOCREV CUR MEDS BY ELIG CLIN: HCPCS | Performed by: INTERNAL MEDICINE

## 2025-08-13 RX ORDER — METOPROLOL SUCCINATE 25 MG/1
25 TABLET, EXTENDED RELEASE ORAL DAILY
Qty: 90 TABLET | Refills: 3 | Status: SHIPPED | OUTPATIENT
Start: 2025-08-13

## 2025-08-19 ENCOUNTER — TELEPHONE (OUTPATIENT)
Dept: CARDIOLOGY CLINIC | Age: 55
End: 2025-08-19

## 2025-09-02 ENCOUNTER — TELEPHONE (OUTPATIENT)
Dept: CARDIOLOGY CLINIC | Age: 55
End: 2025-09-02

## 2025-09-03 RX ORDER — METOPROLOL SUCCINATE 25 MG/1
25 TABLET, EXTENDED RELEASE ORAL DAILY
Qty: 90 TABLET | Refills: 3 | Status: SHIPPED | OUTPATIENT
Start: 2025-09-03